# Patient Record
Sex: FEMALE | Race: WHITE | NOT HISPANIC OR LATINO | Employment: OTHER | URBAN - NONMETROPOLITAN AREA
[De-identification: names, ages, dates, MRNs, and addresses within clinical notes are randomized per-mention and may not be internally consistent; named-entity substitution may affect disease eponyms.]

---

## 2021-06-10 ENCOUNTER — APPOINTMENT (OUTPATIENT)
Dept: LAB | Facility: HOSPITAL | Age: 86
End: 2021-06-10
Attending: NURSE PRACTITIONER
Payer: MEDICARE

## 2021-06-10 DIAGNOSIS — N39.0 URINARY TRACT INFECTION, SITE NOT SPECIFIED: Primary | ICD-10-CM

## 2021-06-10 LAB
BACTERIA #/AREA URNS HPF: ABNORMAL /HPF
BILIRUB UR QL STRIP: NEGATIVE
CLARITY UR: CLEAR
COLOR UR: YELLOW
GLUCOSE UR QL STRIP: NEGATIVE
HGB UR QL STRIP: NEGATIVE
HYALINE CASTS #/AREA URNS LPF: 0 /LPF
KETONES UR QL STRIP: NEGATIVE
LEUKOCYTE ESTERASE UR QL STRIP: ABNORMAL
MICROSCOPIC COMMENT: ABNORMAL
NITRITE UR QL STRIP: NEGATIVE
PH UR STRIP: 6 [PH] (ref 5–8)
PROT UR QL STRIP: NEGATIVE
RBC #/AREA URNS HPF: ABNORMAL /HPF (ref 0–4)
SP GR UR STRIP: 1.02 (ref 1–1.03)
SQUAMOUS #/AREA URNS HPF: 9 /HPF
URN SPEC COLLECT METH UR: ABNORMAL
UROBILINOGEN UR STRIP-ACNC: 1 EU/DL
WBC #/AREA URNS HPF: 1 /HPF (ref 0–5)
YEAST URNS QL MICRO: ABNORMAL

## 2021-06-10 PROCEDURE — 81000 URINALYSIS NONAUTO W/SCOPE: CPT | Performed by: NURSE PRACTITIONER

## 2021-08-11 ENCOUNTER — APPOINTMENT (OUTPATIENT)
Dept: LAB | Facility: HOSPITAL | Age: 86
End: 2021-08-11
Attending: INTERNAL MEDICINE
Payer: COMMERCIAL

## 2021-08-11 DIAGNOSIS — N39.0 URINARY TRACT INFECTION, SITE NOT SPECIFIED: Primary | ICD-10-CM

## 2021-08-11 PROCEDURE — 87077 CULTURE AEROBIC IDENTIFY: CPT | Performed by: INTERNAL MEDICINE

## 2021-08-11 PROCEDURE — 87186 SC STD MICRODIL/AGAR DIL: CPT | Performed by: INTERNAL MEDICINE

## 2021-08-11 PROCEDURE — 87086 URINE CULTURE/COLONY COUNT: CPT | Performed by: INTERNAL MEDICINE

## 2021-08-11 PROCEDURE — 87088 URINE BACTERIA CULTURE: CPT | Performed by: INTERNAL MEDICINE

## 2021-08-14 LAB — BACTERIA UR CULT: ABNORMAL

## 2021-08-18 ENCOUNTER — HOSPITAL ENCOUNTER (EMERGENCY)
Facility: HOSPITAL | Age: 86
Discharge: HOME | End: 2021-08-19
Attending: EMERGENCY MEDICINE
Payer: COMMERCIAL
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
## 2021-08-18 DIAGNOSIS — S01.01XA SCALP LACERATION, INITIAL ENCOUNTER: ICD-10-CM

## 2021-08-18 LAB
ALBUMIN SERPL BCP-MCNC: 3.6 G/DL (ref 3.5–5.2)
ALP SERPL-CCNC: 100 U/L (ref 55–135)
ALT SERPL W/O P-5'-P-CCNC: 13 U/L (ref 10–44)
ANION GAP SERPL CALC-SCNC: 9 MMOL/L (ref 8–16)
AST SERPL-CCNC: 20 U/L (ref 10–40)
BASOPHILS # BLD AUTO: 0.06 K/UL (ref 0–0.2)
BASOPHILS NFR BLD: 0.8 % (ref 0–1.9)
BILIRUB SERPL-MCNC: 0.2 MG/DL (ref 0.1–1)
BUN SERPL-MCNC: 20 MG/DL (ref 10–30)
CALCIUM SERPL-MCNC: 9 MG/DL (ref 8.7–10.5)
CHLORIDE SERPL-SCNC: 103 MMOL/L (ref 95–110)
CO2 SERPL-SCNC: 31 MMOL/L (ref 23–29)
CREAT SERPL-MCNC: 1 MG/DL (ref 0.5–1.4)
DIFFERENTIAL METHOD: ABNORMAL
EOSINOPHIL # BLD AUTO: 0.3 K/UL (ref 0–0.5)
EOSINOPHIL NFR BLD: 4.6 % (ref 0–8)
ERYTHROCYTE [DISTWIDTH] IN BLOOD BY AUTOMATED COUNT: 15.7 % (ref 11.5–14.5)
EST. GFR  (AFRICAN AMERICAN): 56.1 ML/MIN/1.73 M^2
EST. GFR  (NON AFRICAN AMERICAN): 48.7 ML/MIN/1.73 M^2
GLUCOSE SERPL-MCNC: 126 MG/DL (ref 70–110)
HCT VFR BLD AUTO: 35.6 % (ref 37–48.5)
HGB BLD-MCNC: 11.7 G/DL (ref 12–16)
IMM GRANULOCYTES # BLD AUTO: 0.02 K/UL (ref 0–0.04)
IMM GRANULOCYTES NFR BLD AUTO: 0.3 % (ref 0–0.5)
INR PPP: 1 (ref 0.8–1.2)
LYMPHOCYTES # BLD AUTO: 2.6 K/UL (ref 1–4.8)
LYMPHOCYTES NFR BLD: 34.5 % (ref 18–48)
MCH RBC QN AUTO: 29.8 PG (ref 27–31)
MCHC RBC AUTO-ENTMCNC: 32.9 G/DL (ref 32–36)
MCV RBC AUTO: 91 FL (ref 82–98)
MONOCYTES # BLD AUTO: 0.6 K/UL (ref 0.3–1)
MONOCYTES NFR BLD: 7.7 % (ref 4–15)
NEUTROPHILS # BLD AUTO: 3.9 K/UL (ref 1.8–7.7)
NEUTROPHILS NFR BLD: 52.1 % (ref 38–73)
NRBC BLD-RTO: 0 /100 WBC
PLATELET # BLD AUTO: 160 K/UL (ref 150–450)
PMV BLD AUTO: 10.9 FL (ref 9.2–12.9)
POTASSIUM SERPL-SCNC: 3.6 MMOL/L (ref 3.5–5.1)
PROT SERPL-MCNC: 7.4 G/DL (ref 6–8.4)
PROTHROMBIN TIME: 10.6 SEC (ref 9–12.5)
RBC # BLD AUTO: 3.92 M/UL (ref 4–5.4)
SODIUM SERPL-SCNC: 143 MMOL/L (ref 136–145)
WBC # BLD AUTO: 7.41 K/UL (ref 3.9–12.7)

## 2021-08-18 PROCEDURE — 12002 RPR S/N/AX/GEN/TRNK2.6-7.5CM: CPT

## 2021-08-18 PROCEDURE — 99284 EMERGENCY DEPT VISIT MOD MDM: CPT | Mod: 25

## 2021-08-18 PROCEDURE — 85610 PROTHROMBIN TIME: CPT | Performed by: EMERGENCY MEDICINE

## 2021-08-18 PROCEDURE — 85025 COMPLETE CBC W/AUTO DIFF WBC: CPT | Performed by: EMERGENCY MEDICINE

## 2021-08-18 PROCEDURE — 36415 COLL VENOUS BLD VENIPUNCTURE: CPT | Performed by: EMERGENCY MEDICINE

## 2021-08-18 PROCEDURE — 80053 COMPREHEN METABOLIC PANEL: CPT | Performed by: EMERGENCY MEDICINE

## 2021-08-18 RX ORDER — ASPIRIN 81 MG/1
81 TABLET ORAL DAILY
COMMUNITY

## 2021-08-18 RX ORDER — MUPIROCIN 20 MG/G
1 OINTMENT TOPICAL
Status: COMPLETED | OUTPATIENT
Start: 2021-08-19 | End: 2021-08-19

## 2021-08-18 RX ORDER — POTASSIUM CHLORIDE 750 MG/1
20 CAPSULE, EXTENDED RELEASE ORAL DAILY
COMMUNITY
End: 2022-01-01

## 2021-08-18 RX ORDER — FUROSEMIDE 20 MG/1
20 TABLET ORAL 2 TIMES DAILY
COMMUNITY
End: 2022-01-01

## 2021-08-18 RX ORDER — METOPROLOL TARTRATE 25 MG/1
25 TABLET, FILM COATED ORAL DAILY
Status: ON HOLD | COMMUNITY
End: 2023-01-01 | Stop reason: HOSPADM

## 2021-08-19 VITALS
WEIGHT: 119.81 LBS | OXYGEN SATURATION: 98 % | RESPIRATION RATE: 18 BRPM | TEMPERATURE: 98 F | HEIGHT: 61 IN | BODY MASS INDEX: 22.62 KG/M2 | SYSTOLIC BLOOD PRESSURE: 150 MMHG | DIASTOLIC BLOOD PRESSURE: 70 MMHG | HEART RATE: 84 BPM

## 2021-08-19 PROCEDURE — 25000003 PHARM REV CODE 250: Performed by: EMERGENCY MEDICINE

## 2021-08-19 RX ADMIN — MUPIROCIN 22 G: 20 OINTMENT TOPICAL at 12:08

## 2022-01-01 ENCOUNTER — HOSPITAL ENCOUNTER (OUTPATIENT)
Dept: RADIOLOGY | Facility: HOSPITAL | Age: 87
Discharge: HOME | End: 2022-12-29
Payer: MEDICARE

## 2022-01-01 DIAGNOSIS — R05.8 PRODUCTIVE COUGH: ICD-10-CM

## 2022-01-01 DIAGNOSIS — R50.9 FEVER, UNSPECIFIED FEVER CAUSE: ICD-10-CM

## 2022-01-01 PROCEDURE — 71046 X-RAY EXAM CHEST 2 VIEWS: CPT | Mod: TC

## 2022-06-03 ENCOUNTER — LAB VISIT (OUTPATIENT)
Dept: LAB | Facility: HOSPITAL | Age: 87
End: 2022-06-03
Attending: INTERNAL MEDICINE
Payer: COMMERCIAL

## 2022-06-03 DIAGNOSIS — N30.00 ACUTE CYSTITIS WITHOUT HEMATURIA: ICD-10-CM

## 2022-06-03 PROBLEM — E78.5 HYPERLIPIDEMIA: Status: ACTIVE | Noted: 2022-06-03

## 2022-06-03 PROBLEM — N39.0 URINARY TRACT INFECTION: Status: ACTIVE | Noted: 2022-06-03

## 2022-06-03 PROBLEM — I25.10 ARTERIOSCLEROSIS OF CORONARY ARTERY: Status: ACTIVE | Noted: 2022-06-03

## 2022-06-03 PROBLEM — K21.9 GASTROESOPHAGEAL REFLUX: Status: ACTIVE | Noted: 2022-06-03

## 2022-06-03 PROBLEM — H61.22 IMPACTED CERUMEN OF LEFT EAR: Status: ACTIVE | Noted: 2022-06-03

## 2022-06-03 PROBLEM — I10 BENIGN ESSENTIAL HYPERTENSION: Status: ACTIVE | Noted: 2022-06-03

## 2022-06-03 PROCEDURE — 87186 SC STD MICRODIL/AGAR DIL: CPT | Performed by: INTERNAL MEDICINE

## 2022-06-03 PROCEDURE — 87077 CULTURE AEROBIC IDENTIFY: CPT | Performed by: INTERNAL MEDICINE

## 2022-06-03 PROCEDURE — 87086 URINE CULTURE/COLONY COUNT: CPT | Performed by: INTERNAL MEDICINE

## 2022-06-03 PROCEDURE — 87088 URINE BACTERIA CULTURE: CPT | Performed by: INTERNAL MEDICINE

## 2022-06-06 LAB — BACTERIA UR CULT: ABNORMAL

## 2022-12-06 PROBLEM — Z00.00 WELLNESS EXAMINATION: Status: ACTIVE | Noted: 2022-01-01

## 2022-12-29 NOTE — PROGRESS NOTES
Please let patient know xray does not show any acute findings but does show scarring of the lung tissue.  Continue current antibiotics and let us know if she has any worsening of symptoms or increasing SOB.

## 2023-01-01 ENCOUNTER — HOSPITAL ENCOUNTER (INPATIENT)
Facility: HOSPITAL | Age: 88
LOS: 2 days | Discharge: HOME | DRG: 194 | End: 2023-05-05
Attending: STUDENT IN AN ORGANIZED HEALTH CARE EDUCATION/TRAINING PROGRAM | Admitting: INTERNAL MEDICINE
Payer: MEDICARE

## 2023-01-01 ENCOUNTER — HOSPITAL ENCOUNTER (EMERGENCY)
Facility: HOSPITAL | Age: 88
Discharge: HOME | End: 2023-01-06
Attending: STUDENT IN AN ORGANIZED HEALTH CARE EDUCATION/TRAINING PROGRAM
Payer: MEDICARE

## 2023-01-01 VITALS
TEMPERATURE: 98 F | HEART RATE: 65 BPM | WEIGHT: 138.69 LBS | RESPIRATION RATE: 18 BRPM | HEIGHT: 63 IN | BODY MASS INDEX: 24.57 KG/M2 | OXYGEN SATURATION: 96 % | DIASTOLIC BLOOD PRESSURE: 64 MMHG | SYSTOLIC BLOOD PRESSURE: 104 MMHG

## 2023-01-01 VITALS
DIASTOLIC BLOOD PRESSURE: 54 MMHG | RESPIRATION RATE: 32 BRPM | OXYGEN SATURATION: 95 % | TEMPERATURE: 97 F | WEIGHT: 90 LBS | HEIGHT: 60 IN | HEART RATE: 105 BPM | SYSTOLIC BLOOD PRESSURE: 108 MMHG | BODY MASS INDEX: 17.67 KG/M2

## 2023-01-01 DIAGNOSIS — J18.9 PNEUMONIA DUE TO INFECTIOUS ORGANISM, UNSPECIFIED LATERALITY, UNSPECIFIED PART OF LUNG: Primary | ICD-10-CM

## 2023-01-01 DIAGNOSIS — R06.89 RESPIRATORY INSUFFICIENCY: ICD-10-CM

## 2023-01-01 DIAGNOSIS — R54 AGE-RELATED PHYSICAL DEBILITY: ICD-10-CM

## 2023-01-01 DIAGNOSIS — R06.02 SOB (SHORTNESS OF BREATH): ICD-10-CM

## 2023-01-01 DIAGNOSIS — R05.9 COUGH: ICD-10-CM

## 2023-01-01 DIAGNOSIS — E87.20 LACTIC ACIDOSIS: ICD-10-CM

## 2023-01-01 DIAGNOSIS — R07.9 CHEST PAIN: ICD-10-CM

## 2023-01-01 DIAGNOSIS — R53.1 WEAKNESS: ICD-10-CM

## 2023-01-01 DIAGNOSIS — J18.9 PNEUMONIA OF RIGHT LUNG DUE TO INFECTIOUS ORGANISM, UNSPECIFIED PART OF LUNG: Primary | ICD-10-CM

## 2023-01-01 LAB
ALBUMIN SERPL BCP-MCNC: 2.1 G/DL (ref 3.5–5.2)
ALBUMIN SERPL BCP-MCNC: 2.3 G/DL (ref 3.5–5.2)
ALBUMIN SERPL BCP-MCNC: 2.6 G/DL (ref 3.5–5.2)
ALBUMIN SERPL BCP-MCNC: 3.1 G/DL (ref 3.5–5.2)
ALP SERPL-CCNC: 103 U/L (ref 55–135)
ALP SERPL-CCNC: 46 U/L (ref 55–135)
ALP SERPL-CCNC: 54 U/L (ref 55–135)
ALP SERPL-CCNC: 70 U/L (ref 55–135)
ALT SERPL W/O P-5'-P-CCNC: 22 U/L (ref 10–44)
ALT SERPL W/O P-5'-P-CCNC: 26 U/L (ref 10–44)
ALT SERPL W/O P-5'-P-CCNC: 35 U/L (ref 10–44)
ALT SERPL W/O P-5'-P-CCNC: 38 U/L (ref 10–44)
ANION GAP SERPL CALC-SCNC: 1 MMOL/L (ref 8–16)
ANION GAP SERPL CALC-SCNC: 4 MMOL/L (ref 8–16)
ANION GAP SERPL CALC-SCNC: 5 MMOL/L (ref 8–16)
ANION GAP SERPL CALC-SCNC: 5 MMOL/L (ref 8–16)
APTT PPP: 23.3 SEC (ref 21–32)
AST SERPL-CCNC: 29 U/L (ref 10–40)
AST SERPL-CCNC: 43 U/L (ref 10–40)
AST SERPL-CCNC: 58 U/L (ref 10–40)
AST SERPL-CCNC: 69 U/L (ref 10–40)
BACTERIA BLD CULT: NORMAL
BACTERIA BLD CULT: NORMAL
BASOPHILS # BLD AUTO: 0.01 K/UL (ref 0–0.2)
BASOPHILS # BLD AUTO: 0.04 K/UL (ref 0–0.2)
BASOPHILS # BLD AUTO: 0.1 K/UL (ref 0–0.2)
BASOPHILS # BLD AUTO: ABNORMAL K/UL (ref 0–0.2)
BASOPHILS NFR BLD: 0 % (ref 0–1.9)
BASOPHILS NFR BLD: 0.1 % (ref 0–1.9)
BASOPHILS NFR BLD: 0.4 % (ref 0–1.9)
BASOPHILS NFR BLD: 0.7 % (ref 0–1.9)
BILIRUB SERPL-MCNC: 0.4 MG/DL (ref 0.1–1)
BILIRUB SERPL-MCNC: 0.6 MG/DL (ref 0.1–1)
BILIRUB SERPL-MCNC: 0.6 MG/DL (ref 0.1–1)
BILIRUB SERPL-MCNC: 0.7 MG/DL (ref 0.1–1)
BUN SERPL-MCNC: 11 MG/DL (ref 10–30)
BUN SERPL-MCNC: 12 MG/DL (ref 10–30)
BUN SERPL-MCNC: 15 MG/DL (ref 10–30)
BUN SERPL-MCNC: 32 MG/DL (ref 10–30)
CALCIUM SERPL-MCNC: 8.2 MG/DL (ref 8.7–10.5)
CALCIUM SERPL-MCNC: 8.3 MG/DL (ref 8.7–10.5)
CALCIUM SERPL-MCNC: 9 MG/DL (ref 8.7–10.5)
CALCIUM SERPL-MCNC: 9.2 MG/DL (ref 8.7–10.5)
CHLORIDE SERPL-SCNC: 100 MMOL/L (ref 95–110)
CHLORIDE SERPL-SCNC: 103 MMOL/L (ref 95–110)
CHLORIDE SERPL-SCNC: 105 MMOL/L (ref 95–110)
CHLORIDE SERPL-SCNC: 99 MMOL/L (ref 95–110)
CO2 SERPL-SCNC: 27 MMOL/L (ref 23–29)
CO2 SERPL-SCNC: 30 MMOL/L (ref 23–29)
CO2 SERPL-SCNC: 31 MMOL/L (ref 23–29)
CO2 SERPL-SCNC: 33 MMOL/L (ref 23–29)
CREAT SERPL-MCNC: 0.7 MG/DL (ref 0.5–1.4)
CREAT SERPL-MCNC: 0.7 MG/DL (ref 0.5–1.4)
CREAT SERPL-MCNC: 1 MG/DL (ref 0.5–1.4)
CREAT SERPL-MCNC: 1.1 MG/DL (ref 0.5–1.4)
DIFFERENTIAL METHOD: ABNORMAL
EOSINOPHIL # BLD AUTO: 0 K/UL (ref 0–0.5)
EOSINOPHIL # BLD AUTO: 0.1 K/UL (ref 0–0.5)
EOSINOPHIL # BLD AUTO: 0.1 K/UL (ref 0–0.5)
EOSINOPHIL # BLD AUTO: ABNORMAL K/UL (ref 0–0.5)
EOSINOPHIL NFR BLD: 0 % (ref 0–8)
EOSINOPHIL NFR BLD: 0 % (ref 0–8)
EOSINOPHIL NFR BLD: 0.8 % (ref 0–8)
EOSINOPHIL NFR BLD: 1 % (ref 0–8)
ERYTHROCYTE [DISTWIDTH] IN BLOOD BY AUTOMATED COUNT: 14.6 % (ref 11.5–14.5)
ERYTHROCYTE [DISTWIDTH] IN BLOOD BY AUTOMATED COUNT: 15 % (ref 11.5–14.5)
ERYTHROCYTE [DISTWIDTH] IN BLOOD BY AUTOMATED COUNT: 15 % (ref 11.5–14.5)
ERYTHROCYTE [DISTWIDTH] IN BLOOD BY AUTOMATED COUNT: 15.5 % (ref 11.5–14.5)
EST. GFR  (NO RACE VARIABLE): 46.6 ML/MIN/1.73 M^2
EST. GFR  (NO RACE VARIABLE): 51.9 ML/MIN/1.73 M^2
EST. GFR  (NO RACE VARIABLE): >60 ML/MIN/1.73 M^2
EST. GFR  (NO RACE VARIABLE): >60 ML/MIN/1.73 M^2
GLUCOSE SERPL-MCNC: 121 MG/DL (ref 70–110)
GLUCOSE SERPL-MCNC: 129 MG/DL (ref 70–110)
GLUCOSE SERPL-MCNC: 136 MG/DL (ref 70–110)
GLUCOSE SERPL-MCNC: 142 MG/DL (ref 70–110)
HCT VFR BLD AUTO: 31.7 % (ref 37–48.5)
HCT VFR BLD AUTO: 34.6 % (ref 37–48.5)
HCT VFR BLD AUTO: 42.5 % (ref 37–48.5)
HCT VFR BLD AUTO: 42.6 % (ref 37–48.5)
HGB BLD-MCNC: 10.9 G/DL (ref 12–16)
HGB BLD-MCNC: 12 G/DL (ref 12–16)
HGB BLD-MCNC: 14.2 G/DL (ref 12–16)
HGB BLD-MCNC: 14.4 G/DL (ref 12–16)
HYPOCHROMIA BLD QL SMEAR: ABNORMAL
IMM GRANULOCYTES # BLD AUTO: 0.03 K/UL (ref 0–0.04)
IMM GRANULOCYTES # BLD AUTO: 0.08 K/UL (ref 0–0.04)
IMM GRANULOCYTES # BLD AUTO: 0.57 K/UL (ref 0–0.04)
IMM GRANULOCYTES # BLD AUTO: ABNORMAL K/UL (ref 0–0.04)
IMM GRANULOCYTES NFR BLD AUTO: 0.3 % (ref 0–0.5)
IMM GRANULOCYTES NFR BLD AUTO: 0.7 % (ref 0–0.5)
IMM GRANULOCYTES NFR BLD AUTO: 4 % (ref 0–0.5)
IMM GRANULOCYTES NFR BLD AUTO: ABNORMAL % (ref 0–0.5)
INR PPP: 1.2 (ref 0.8–1.2)
LACTATE SERPL-SCNC: 3.4 MMOL/L (ref 0.5–2.2)
LACTATE SERPL-SCNC: 4.4 MMOL/L (ref 0.5–2.2)
LACTATE SERPL-SCNC: 5.9 MMOL/L (ref 0.5–2.2)
LYMPHOCYTES # BLD AUTO: 0.8 K/UL (ref 1–4.8)
LYMPHOCYTES # BLD AUTO: 1.9 K/UL (ref 1–4.8)
LYMPHOCYTES # BLD AUTO: 2.2 K/UL (ref 1–4.8)
LYMPHOCYTES # BLD AUTO: ABNORMAL K/UL (ref 1–4.8)
LYMPHOCYTES NFR BLD: 10 % (ref 18–48)
LYMPHOCYTES NFR BLD: 15 % (ref 18–48)
LYMPHOCYTES NFR BLD: 18.7 % (ref 18–48)
LYMPHOCYTES NFR BLD: 7.7 % (ref 18–48)
MAGNESIUM SERPL-MCNC: 1.7 MG/DL (ref 1.6–2.6)
MAGNESIUM SERPL-MCNC: 1.8 MG/DL (ref 1.6–2.6)
MCH RBC QN AUTO: 28.8 PG (ref 27–31)
MCH RBC QN AUTO: 31.2 PG (ref 27–31)
MCH RBC QN AUTO: 31.2 PG (ref 27–31)
MCH RBC QN AUTO: 31.3 PG (ref 27–31)
MCHC RBC AUTO-ENTMCNC: 33.4 G/DL (ref 32–36)
MCHC RBC AUTO-ENTMCNC: 33.8 G/DL (ref 32–36)
MCHC RBC AUTO-ENTMCNC: 34.4 G/DL (ref 32–36)
MCHC RBC AUTO-ENTMCNC: 34.7 G/DL (ref 32–36)
MCV RBC AUTO: 86 FL (ref 82–98)
MCV RBC AUTO: 90 FL (ref 82–98)
MCV RBC AUTO: 91 FL (ref 82–98)
MCV RBC AUTO: 92 FL (ref 82–98)
MONOCYTES # BLD AUTO: 0.3 K/UL (ref 0.3–1)
MONOCYTES # BLD AUTO: 0.4 K/UL (ref 0.3–1)
MONOCYTES # BLD AUTO: 0.6 K/UL (ref 0.3–1)
MONOCYTES # BLD AUTO: ABNORMAL K/UL (ref 0.3–1)
MONOCYTES NFR BLD: 2.6 % (ref 4–15)
MONOCYTES NFR BLD: 4 % (ref 4–15)
MONOCYTES NFR BLD: 4.2 % (ref 4–15)
MONOCYTES NFR BLD: 4.3 % (ref 4–15)
NEUTROPHILS # BLD AUTO: 10.8 K/UL (ref 1.8–7.7)
NEUTROPHILS # BLD AUTO: 7.6 K/UL (ref 1.8–7.7)
NEUTROPHILS # BLD AUTO: 9.6 K/UL (ref 1.8–7.7)
NEUTROPHILS NFR BLD: 75.2 % (ref 38–73)
NEUTROPHILS NFR BLD: 75.4 % (ref 38–73)
NEUTROPHILS NFR BLD: 77 % (ref 38–73)
NEUTROPHILS NFR BLD: 88.9 % (ref 38–73)
NEUTS BAND NFR BLD MANUAL: 9 %
NRBC BLD-RTO: 0 /100 WBC
NT-PROBNP SERPL-MCNC: 1242 PG/ML (ref 5–1800)
NT-PROBNP SERPL-MCNC: 578 PG/ML (ref 5–1800)
PLATELET # BLD AUTO: 131 K/UL (ref 150–450)
PLATELET # BLD AUTO: 131 K/UL (ref 150–450)
PLATELET # BLD AUTO: 185 K/UL (ref 150–450)
PLATELET # BLD AUTO: 275 K/UL (ref 150–450)
PLATELET BLD QL SMEAR: ABNORMAL
PMV BLD AUTO: 10.5 FL (ref 9.2–12.9)
PMV BLD AUTO: 10.5 FL (ref 9.2–12.9)
PMV BLD AUTO: 10.6 FL (ref 9.2–12.9)
PMV BLD AUTO: 11.6 FL (ref 9.2–12.9)
POTASSIUM SERPL-SCNC: 3.1 MMOL/L (ref 3.5–5.1)
POTASSIUM SERPL-SCNC: 4 MMOL/L (ref 3.5–5.1)
POTASSIUM SERPL-SCNC: 4 MMOL/L (ref 3.5–5.1)
POTASSIUM SERPL-SCNC: 5.2 MMOL/L (ref 3.5–5.1)
PROT SERPL-MCNC: 6.4 G/DL (ref 6–8.4)
PROT SERPL-MCNC: 6.6 G/DL (ref 6–8.4)
PROT SERPL-MCNC: 8.2 G/DL (ref 6–8.4)
PROT SERPL-MCNC: 8.3 G/DL (ref 6–8.4)
PROTHROMBIN TIME: 12.1 SEC (ref 9–12.5)
RBC # BLD AUTO: 3.48 M/UL (ref 4–5.4)
RBC # BLD AUTO: 3.85 M/UL (ref 4–5.4)
RBC # BLD AUTO: 4.62 M/UL (ref 4–5.4)
RBC # BLD AUTO: 4.93 M/UL (ref 4–5.4)
SODIUM SERPL-SCNC: 134 MMOL/L (ref 136–145)
SODIUM SERPL-SCNC: 135 MMOL/L (ref 136–145)
SODIUM SERPL-SCNC: 135 MMOL/L (ref 136–145)
SODIUM SERPL-SCNC: 139 MMOL/L (ref 136–145)
TROPONIN I SERPL DL<=0.01 NG/ML-MCNC: 18.5 PG/ML (ref 0–60)
TROPONIN I SERPL DL<=0.01 NG/ML-MCNC: 56 PG/ML (ref 0–60)
WBC # BLD AUTO: 10.14 K/UL (ref 3.9–12.7)
WBC # BLD AUTO: 10.79 K/UL (ref 3.9–12.7)
WBC # BLD AUTO: 14.4 K/UL (ref 3.9–12.7)
WBC # BLD AUTO: 16.48 K/UL (ref 3.9–12.7)

## 2023-01-01 PROCEDURE — 63600175 PHARM REV CODE 636 W HCPCS: Performed by: INTERNAL MEDICINE

## 2023-01-01 PROCEDURE — 63600175 PHARM REV CODE 636 W HCPCS: Performed by: STUDENT IN AN ORGANIZED HEALTH CARE EDUCATION/TRAINING PROGRAM

## 2023-01-01 PROCEDURE — 25000003 PHARM REV CODE 250: Performed by: STUDENT IN AN ORGANIZED HEALTH CARE EDUCATION/TRAINING PROGRAM

## 2023-01-01 PROCEDURE — 80053 COMPREHEN METABOLIC PANEL: CPT | Performed by: INTERNAL MEDICINE

## 2023-01-01 PROCEDURE — 84484 ASSAY OF TROPONIN QUANT: CPT | Performed by: STUDENT IN AN ORGANIZED HEALTH CARE EDUCATION/TRAINING PROGRAM

## 2023-01-01 PROCEDURE — 99900031 HC PATIENT EDUCATION (STAT)

## 2023-01-01 PROCEDURE — 99900035 HC TECH TIME PER 15 MIN (STAT)

## 2023-01-01 PROCEDURE — 27000221 HC OXYGEN, UP TO 24 HOURS

## 2023-01-01 PROCEDURE — 94761 N-INVAS EAR/PLS OXIMETRY MLT: CPT

## 2023-01-01 PROCEDURE — 83605 ASSAY OF LACTIC ACID: CPT | Performed by: STUDENT IN AN ORGANIZED HEALTH CARE EDUCATION/TRAINING PROGRAM

## 2023-01-01 PROCEDURE — 99285 EMERGENCY DEPT VISIT HI MDM: CPT | Mod: 25

## 2023-01-01 PROCEDURE — 96365 THER/PROPH/DIAG IV INF INIT: CPT

## 2023-01-01 PROCEDURE — 94640 AIRWAY INHALATION TREATMENT: CPT

## 2023-01-01 PROCEDURE — 80053 COMPREHEN METABOLIC PANEL: CPT | Performed by: STUDENT IN AN ORGANIZED HEALTH CARE EDUCATION/TRAINING PROGRAM

## 2023-01-01 PROCEDURE — 93005 ELECTROCARDIOGRAM TRACING: CPT

## 2023-01-01 PROCEDURE — 93010 EKG 12-LEAD: ICD-10-PCS | Mod: ,,, | Performed by: INTERNAL MEDICINE

## 2023-01-01 PROCEDURE — 85730 THROMBOPLASTIN TIME PARTIAL: CPT | Performed by: STUDENT IN AN ORGANIZED HEALTH CARE EDUCATION/TRAINING PROGRAM

## 2023-01-01 PROCEDURE — 25500020 PHARM REV CODE 255: Performed by: INTERNAL MEDICINE

## 2023-01-01 PROCEDURE — 85007 BL SMEAR W/DIFF WBC COUNT: CPT | Performed by: STUDENT IN AN ORGANIZED HEALTH CARE EDUCATION/TRAINING PROGRAM

## 2023-01-01 PROCEDURE — 85025 COMPLETE CBC W/AUTO DIFF WBC: CPT | Performed by: STUDENT IN AN ORGANIZED HEALTH CARE EDUCATION/TRAINING PROGRAM

## 2023-01-01 PROCEDURE — 94760 N-INVAS EAR/PLS OXIMETRY 1: CPT

## 2023-01-01 PROCEDURE — 83880 ASSAY OF NATRIURETIC PEPTIDE: CPT | Performed by: STUDENT IN AN ORGANIZED HEALTH CARE EDUCATION/TRAINING PROGRAM

## 2023-01-01 PROCEDURE — 36415 COLL VENOUS BLD VENIPUNCTURE: CPT | Performed by: STUDENT IN AN ORGANIZED HEALTH CARE EDUCATION/TRAINING PROGRAM

## 2023-01-01 PROCEDURE — 85610 PROTHROMBIN TIME: CPT | Performed by: STUDENT IN AN ORGANIZED HEALTH CARE EDUCATION/TRAINING PROGRAM

## 2023-01-01 PROCEDURE — 83735 ASSAY OF MAGNESIUM: CPT | Performed by: STUDENT IN AN ORGANIZED HEALTH CARE EDUCATION/TRAINING PROGRAM

## 2023-01-01 PROCEDURE — 25000242 PHARM REV CODE 250 ALT 637 W/ HCPCS: Performed by: INTERNAL MEDICINE

## 2023-01-01 PROCEDURE — 85025 COMPLETE CBC W/AUTO DIFF WBC: CPT | Performed by: INTERNAL MEDICINE

## 2023-01-01 PROCEDURE — 83605 ASSAY OF LACTIC ACID: CPT | Mod: 91 | Performed by: INTERNAL MEDICINE

## 2023-01-01 PROCEDURE — 25000003 PHARM REV CODE 250: Performed by: INTERNAL MEDICINE

## 2023-01-01 PROCEDURE — 93010 ELECTROCARDIOGRAM REPORT: CPT | Mod: ,,, | Performed by: INTERNAL MEDICINE

## 2023-01-01 PROCEDURE — 87040 BLOOD CULTURE FOR BACTERIA: CPT | Mod: 59 | Performed by: STUDENT IN AN ORGANIZED HEALTH CARE EDUCATION/TRAINING PROGRAM

## 2023-01-01 PROCEDURE — 25000242 PHARM REV CODE 250 ALT 637 W/ HCPCS: Performed by: STUDENT IN AN ORGANIZED HEALTH CARE EDUCATION/TRAINING PROGRAM

## 2023-01-01 PROCEDURE — 21400001 HC TELEMETRY ROOM

## 2023-01-01 PROCEDURE — 36415 COLL VENOUS BLD VENIPUNCTURE: CPT | Performed by: INTERNAL MEDICINE

## 2023-01-01 PROCEDURE — 83735 ASSAY OF MAGNESIUM: CPT | Performed by: INTERNAL MEDICINE

## 2023-01-01 PROCEDURE — 85027 COMPLETE CBC AUTOMATED: CPT | Performed by: STUDENT IN AN ORGANIZED HEALTH CARE EDUCATION/TRAINING PROGRAM

## 2023-01-01 RX ORDER — METHYLPREDNISOLONE SOD SUCC 125 MG
80 VIAL (EA) INJECTION
Status: DISCONTINUED | OUTPATIENT
Start: 2023-01-01 | End: 2023-01-01

## 2023-01-01 RX ORDER — ACETAMINOPHEN 325 MG/1
650 TABLET ORAL EVERY 8 HOURS PRN
Refills: 0
Start: 2023-01-01

## 2023-01-01 RX ORDER — GUAIFENESIN/DEXTROMETHORPHAN 100-10MG/5
10 SYRUP ORAL 3 TIMES DAILY
Qty: 120 ML | Refills: 0
Start: 2023-01-01 | End: 2023-01-01

## 2023-01-01 RX ORDER — ACETAMINOPHEN 325 MG/1
650 TABLET ORAL EVERY 8 HOURS PRN
Status: DISCONTINUED | OUTPATIENT
Start: 2023-01-01 | End: 2023-01-01 | Stop reason: HOSPADM

## 2023-01-01 RX ORDER — METOPROLOL SUCCINATE 25 MG/1
25 TABLET, EXTENDED RELEASE ORAL DAILY
Status: DISCONTINUED | OUTPATIENT
Start: 2023-01-01 | End: 2023-01-01 | Stop reason: HOSPADM

## 2023-01-01 RX ORDER — SODIUM CHLORIDE, SODIUM LACTATE, POTASSIUM CHLORIDE, CALCIUM CHLORIDE 600; 310; 30; 20 MG/100ML; MG/100ML; MG/100ML; MG/100ML
1000 INJECTION, SOLUTION INTRAVENOUS CONTINUOUS
Status: ACTIVE | OUTPATIENT
Start: 2023-01-01 | End: 2023-01-01

## 2023-01-01 RX ORDER — AZITHROMYCIN 250 MG/1
500 TABLET, FILM COATED ORAL
Status: DISCONTINUED | OUTPATIENT
Start: 2023-01-01 | End: 2023-01-01

## 2023-01-01 RX ORDER — POTASSIUM CHLORIDE 20 MEQ/1
20 TABLET, EXTENDED RELEASE ORAL DAILY
Status: DISCONTINUED | OUTPATIENT
Start: 2023-01-01 | End: 2023-01-01

## 2023-01-01 RX ORDER — POTASSIUM CHLORIDE 20 MEQ/1
20 TABLET, EXTENDED RELEASE ORAL 2 TIMES DAILY
Status: DISCONTINUED | OUTPATIENT
Start: 2023-01-01 | End: 2023-01-01

## 2023-01-01 RX ORDER — LEVOFLOXACIN 500 MG/1
500 TABLET, FILM COATED ORAL DAILY
Qty: 7 TABLET | Refills: 0 | Status: SHIPPED | OUTPATIENT
Start: 2023-01-01 | End: 2023-01-01

## 2023-01-01 RX ORDER — ENOXAPARIN SODIUM 100 MG/ML
40 INJECTION SUBCUTANEOUS EVERY 24 HOURS
Status: DISCONTINUED | OUTPATIENT
Start: 2023-01-01 | End: 2023-01-01 | Stop reason: HOSPADM

## 2023-01-01 RX ORDER — ONDANSETRON 2 MG/ML
4 INJECTION INTRAMUSCULAR; INTRAVENOUS EVERY 8 HOURS PRN
Status: DISCONTINUED | OUTPATIENT
Start: 2023-01-01 | End: 2023-01-01 | Stop reason: HOSPADM

## 2023-01-01 RX ORDER — IPRATROPIUM BROMIDE AND ALBUTEROL SULFATE 2.5; .5 MG/3ML; MG/3ML
3 SOLUTION RESPIRATORY (INHALATION)
Status: DISCONTINUED | OUTPATIENT
Start: 2023-01-01 | End: 2023-01-01 | Stop reason: HOSPADM

## 2023-01-01 RX ORDER — GUAIFENESIN/DEXTROMETHORPHAN 100-10MG/5
10 SYRUP ORAL 3 TIMES DAILY
Status: DISCONTINUED | OUTPATIENT
Start: 2023-01-01 | End: 2023-01-01 | Stop reason: HOSPADM

## 2023-01-01 RX ORDER — LEVOFLOXACIN 500 MG/1
500 TABLET, FILM COATED ORAL DAILY
Qty: 5 TABLET | Refills: 0 | Status: SHIPPED | OUTPATIENT
Start: 2023-01-01 | End: 2023-01-01

## 2023-01-01 RX ORDER — MUPIROCIN 20 MG/G
OINTMENT TOPICAL 2 TIMES DAILY
Status: DISCONTINUED | OUTPATIENT
Start: 2023-01-01 | End: 2023-01-01 | Stop reason: HOSPADM

## 2023-01-01 RX ORDER — IPRATROPIUM BROMIDE AND ALBUTEROL SULFATE 2.5; .5 MG/3ML; MG/3ML
3 SOLUTION RESPIRATORY (INHALATION)
Status: COMPLETED | OUTPATIENT
Start: 2023-01-01 | End: 2023-01-01

## 2023-01-01 RX ORDER — ENOXAPARIN SODIUM 100 MG/ML
40 INJECTION SUBCUTANEOUS EVERY 12 HOURS
Status: DISCONTINUED | OUTPATIENT
Start: 2023-01-01 | End: 2023-01-01

## 2023-01-01 RX ORDER — ACETAMINOPHEN 325 MG/1
650 TABLET ORAL
Status: COMPLETED | OUTPATIENT
Start: 2023-01-01 | End: 2023-01-01

## 2023-01-01 RX ORDER — FAMOTIDINE 20 MG/1
20 TABLET, FILM COATED ORAL DAILY
Status: DISCONTINUED | OUTPATIENT
Start: 2023-01-01 | End: 2023-01-01 | Stop reason: HOSPADM

## 2023-01-01 RX ORDER — SODIUM CHLORIDE AND POTASSIUM CHLORIDE 150; 900 MG/100ML; MG/100ML
INJECTION, SOLUTION INTRAVENOUS CONTINUOUS
Status: DISCONTINUED | OUTPATIENT
Start: 2023-01-01 | End: 2023-01-01

## 2023-01-01 RX ORDER — ASPIRIN 81 MG/1
81 TABLET ORAL DAILY
Status: DISCONTINUED | OUTPATIENT
Start: 2023-01-01 | End: 2023-01-01 | Stop reason: HOSPADM

## 2023-01-01 RX ORDER — ALBUTEROL SULFATE 0.83 MG/ML
2.5 SOLUTION RESPIRATORY (INHALATION) 3 TIMES DAILY
Qty: 90 ML | Refills: 3 | Status: SHIPPED | OUTPATIENT
Start: 2023-01-01

## 2023-01-01 RX ORDER — TALC
6 POWDER (GRAM) TOPICAL NIGHTLY PRN
Status: DISCONTINUED | OUTPATIENT
Start: 2023-01-01 | End: 2023-01-01 | Stop reason: HOSPADM

## 2023-01-01 RX ORDER — PREDNISONE 20 MG/1
20 TABLET ORAL DAILY
Qty: 5 TABLET | Refills: 0 | Status: SHIPPED | OUTPATIENT
Start: 2023-01-01 | End: 2023-01-01

## 2023-01-01 RX ORDER — PRAVASTATIN SODIUM 20 MG/1
80 TABLET ORAL DAILY
Status: DISCONTINUED | OUTPATIENT
Start: 2023-01-01 | End: 2023-01-01 | Stop reason: HOSPADM

## 2023-01-01 RX ORDER — SODIUM CHLORIDE 0.9 % (FLUSH) 0.9 %
10 SYRINGE (ML) INJECTION
Status: DISCONTINUED | OUTPATIENT
Start: 2023-01-01 | End: 2023-01-01 | Stop reason: HOSPADM

## 2023-01-01 RX ADMIN — SODIUM CHLORIDE, POTASSIUM CHLORIDE, SODIUM LACTATE AND CALCIUM CHLORIDE 1000 ML: 600; 310; 30; 20 INJECTION, SOLUTION INTRAVENOUS at 02:05

## 2023-01-01 RX ADMIN — POTASSIUM CHLORIDE 20 MEQ: 1500 TABLET, EXTENDED RELEASE ORAL at 09:05

## 2023-01-01 RX ADMIN — FAMOTIDINE 20 MG: 20 TABLET ORAL at 08:05

## 2023-01-01 RX ADMIN — METHYLPREDNISOLONE SODIUM SUCCINATE 80 MG: 125 INJECTION, POWDER, FOR SOLUTION INTRAMUSCULAR; INTRAVENOUS at 12:05

## 2023-01-01 RX ADMIN — ACETAMINOPHEN 650 MG: 325 TABLET ORAL at 12:05

## 2023-01-01 RX ADMIN — IPRATROPIUM BROMIDE AND ALBUTEROL SULFATE 3 ML: .5; 3 SOLUTION RESPIRATORY (INHALATION) at 10:05

## 2023-01-01 RX ADMIN — AZITHROMYCIN DIHYDRATE 500 MG: 500 INJECTION, POWDER, LYOPHILIZED, FOR SOLUTION INTRAVENOUS at 09:05

## 2023-01-01 RX ADMIN — IPRATROPIUM BROMIDE AND ALBUTEROL SULFATE 3 ML: .5; 3 SOLUTION RESPIRATORY (INHALATION) at 03:05

## 2023-01-01 RX ADMIN — GUAIFENESIN AND DEXTROMETHORPHAN 10 ML: 100; 10 SYRUP ORAL at 03:05

## 2023-01-01 RX ADMIN — ASPIRIN 81 MG: 81 TABLET, COATED ORAL at 08:05

## 2023-01-01 RX ADMIN — SODIUM CHLORIDE AND POTASSIUM CHLORIDE: .9; .15 SOLUTION INTRAVENOUS at 10:05

## 2023-01-01 RX ADMIN — ENOXAPARIN SODIUM 40 MG: 40 INJECTION SUBCUTANEOUS at 05:05

## 2023-01-01 RX ADMIN — IOHEXOL 100 ML: 350 INJECTION, SOLUTION INTRAVENOUS at 01:05

## 2023-01-01 RX ADMIN — IPRATROPIUM BROMIDE AND ALBUTEROL SULFATE 3 ML: .5; 3 SOLUTION RESPIRATORY (INHALATION) at 01:05

## 2023-01-01 RX ADMIN — IPRATROPIUM BROMIDE AND ALBUTEROL SULFATE 3 ML: .5; 3 SOLUTION RESPIRATORY (INHALATION) at 07:05

## 2023-01-01 RX ADMIN — METOPROLOL SUCCINATE 25 MG: 25 TABLET, EXTENDED RELEASE ORAL at 08:05

## 2023-01-01 RX ADMIN — METHYLPREDNISOLONE SODIUM SUCCINATE 80 MG: 40 INJECTION, POWDER, FOR SOLUTION INTRAMUSCULAR; INTRAVENOUS at 08:05

## 2023-01-01 RX ADMIN — MUPIROCIN: 20 OINTMENT TOPICAL at 09:05

## 2023-01-01 RX ADMIN — MUPIROCIN: 20 OINTMENT TOPICAL at 08:05

## 2023-01-01 RX ADMIN — CEFTRIAXONE SODIUM 1 G: 1 INJECTION, POWDER, FOR SOLUTION INTRAMUSCULAR; INTRAVENOUS at 12:05

## 2023-01-01 RX ADMIN — ENOXAPARIN SODIUM 40 MG: 40 INJECTION SUBCUTANEOUS at 03:05

## 2023-01-01 RX ADMIN — AZITHROMYCIN DIHYDRATE 500 MG: 500 INJECTION, POWDER, LYOPHILIZED, FOR SOLUTION INTRAVENOUS at 12:05

## 2023-01-01 RX ADMIN — SODIUM CHLORIDE 1000 ML: 9 INJECTION, SOLUTION INTRAVENOUS at 12:05

## 2023-01-01 RX ADMIN — ASPIRIN 81 MG: 81 TABLET, COATED ORAL at 09:05

## 2023-01-01 RX ADMIN — GUAIFENESIN AND DEXTROMETHORPHAN 10 ML: 100; 10 SYRUP ORAL at 08:05

## 2023-01-01 RX ADMIN — CEFTRIAXONE SODIUM 1 G: 1 INJECTION, POWDER, FOR SOLUTION INTRAMUSCULAR; INTRAVENOUS at 01:05

## 2023-01-01 RX ADMIN — PRAVASTATIN SODIUM 80 MG: 20 TABLET ORAL at 09:05

## 2023-01-01 RX ADMIN — GUAIFENESIN AND DEXTROMETHORPHAN 10 ML: 100; 10 SYRUP ORAL at 09:05

## 2023-01-01 RX ADMIN — PRAVASTATIN SODIUM 80 MG: 20 TABLET ORAL at 08:05

## 2023-01-01 RX ADMIN — POTASSIUM CHLORIDE 20 MEQ: 1500 TABLET, EXTENDED RELEASE ORAL at 08:05

## 2023-01-01 RX ADMIN — FAMOTIDINE 20 MG: 20 TABLET ORAL at 09:05

## 2023-01-01 RX ADMIN — METHYLPREDNISOLONE SODIUM SUCCINATE 80 MG: 125 INJECTION, POWDER, FOR SOLUTION INTRAMUSCULAR; INTRAVENOUS at 03:05

## 2023-01-01 RX ADMIN — Medication 6 MG: at 08:05

## 2023-01-01 RX ADMIN — SODIUM CHLORIDE, POTASSIUM CHLORIDE, SODIUM LACTATE AND CALCIUM CHLORIDE 1000 ML: 600; 310; 30; 20 INJECTION, SOLUTION INTRAVENOUS at 09:05

## 2023-01-06 NOTE — ED PROVIDER NOTES
Encounter Date: 1/6/2023       History     Chief Complaint   Patient presents with    Weakness     Generalized weakness, cough cold symptoms x 2 weeks. Was seen by Solet this morning, had labs but was advised to come to ED for further eval.      89-year-old female with history of CABG, hypertension, high cholesterol presents with generalized weakness associated with cough for the past 2 weeks.  Patient was seen by see PCP and x-ray showing no acute changes but was placed on doxycycline.  Patient continues to have symptoms so instructed to come in for evaluation.  Denies any chest pain, shortness of breath, leg swelling,    Review of patient's allergies indicates:  No Known Allergies  Past Medical History:   Diagnosis Date    Anemia     Pernicious    B12 deficiency     CAD (coronary artery disease)     Cellulitis of right leg     Cerumen impaction 08/03/2021    Constipation     Diarrhea     Dizziness     Dysphagia     Gastroenteritis due to group B Shigella     GERD (gastroesophageal reflux disease)     Hyperlipidemia     Hypertension     Pneumonia     Thoracic back pain      Past Surgical History:   Procedure Laterality Date    ABDOMINAL SURGERY      Rib Surgery d/t h/o accident    CHOLECYSTECTOMY      COLONOSCOPY  2012    CORONARY ARTERY BYPASS GRAFT  2000,2006    x2    CYSTOSCOPY WITH URETHRAL DILATION      HYSTERECTOMY      Complete      Family History   Problem Relation Age of Onset    Stomach cancer Mother     Heart disease Father      Social History     Tobacco Use    Smoking status: Never    Smokeless tobacco: Never   Substance Use Topics    Alcohol use: Not Currently    Drug use: Not Currently     Review of Systems   Constitutional: Negative.    HENT: Negative.     Respiratory:  Positive for cough.    Cardiovascular: Negative.    Gastrointestinal: Negative.    Genitourinary: Negative.    Musculoskeletal: Negative.    Skin: Negative.    Neurological:  Positive for weakness.   Psychiatric/Behavioral: Negative.      All other systems reviewed and are negative.    Physical Exam     Initial Vitals   BP Pulse Resp Temp SpO2   01/06/23 0924 01/06/23 0924 01/06/23 0924 01/06/23 0953 01/06/23 0924   (!) 174/79 88 18 97.8 °F (36.6 °C) (!) 94 %      MAP       --                Physical Exam    Nursing note and vitals reviewed.  Constitutional: Vital signs are normal.   Chronically ill   HENT:   Head: Normocephalic and atraumatic.   Eyes: Conjunctivae and lids are normal.   Neck: Trachea normal. Neck supple.   Cardiovascular:  Normal rate, regular rhythm, normal heart sounds, intact distal pulses and normal pulses.           No murmur heard.  Pulmonary/Chest: Breath sounds normal. No respiratory distress.   Abdominal: Abdomen is soft. Bowel sounds are normal.   Musculoskeletal:         General: Normal range of motion.      Cervical back: Neck supple.     Neurological: She is alert and oriented to person, place, and time. She has normal strength. No cranial nerve deficit or sensory deficit.   Skin: Skin is warm. Capillary refill takes less than 2 seconds.   Psychiatric: She has a normal mood and affect. Her speech is normal. Thought content normal.       ED Course   Procedures  Labs Reviewed   CBC W/ AUTO DIFFERENTIAL - Abnormal; Notable for the following components:       Result Value    WBC 14.40 (*)     RDW 15.5 (*)     Immature Granulocytes 4.0 (*)     Gran # (ANC) 10.8 (*)     Immature Grans (Abs) 0.57 (*)     Gran % 75.2 (*)     Lymph % 15.0 (*)     All other components within normal limits   COMPREHENSIVE METABOLIC PANEL - Abnormal; Notable for the following components:    Sodium 135 (*)     Potassium 5.2 (*)     Glucose 121 (*)     BUN 32 (*)     Albumin 2.6 (*)     AST 58 (*)     Anion Gap 5 (*)     eGFR 46.6 (*)     All other components within normal limits   TROPONIN I HIGH SENSITIVITY   NT-PRO NATRIURETIC PEPTIDE   MAGNESIUM     EKG Readings: (Independently Interpreted)   Initial Reading: No STEMI. Rhythm: Normal Sinus  Rhythm.   Left anterior fascicular block     Imaging Results              X-Ray Chest 1 View (Final result)  Result time 01/06/23 10:06:23      Final result by Magen Darnell MD (01/06/23 10:06:23)                   Impression:      Suspected chronic interstitial changes of the lungs along with a possible small right pleural effusion.      Electronically signed by: Magen Darnell MD  Date:    01/06/2023  Time:    10:06               Narrative:    EXAMINATION:  XR CHEST 1 VIEW    CLINICAL HISTORY:  Cough    COMPARISON:  12/29/2022    FINDINGS:  Cardiac silhouette does not appear enlarged.  Thoracic aorta is calcified.  There is sternotomy change.  Diffuse reticular opacities of the lungs are similar to the previous exam, possibly reflecting fibrotic change.  Blunting of the right costophrenic angle may represent a small pleural effusion.  No focal consolidation.                                       Medications - No data to display              ED Course as of 01/06/23 1021   Fri Jan 06, 2023   1018 Spoke to PCP who recommends sitting patient home with change in antibiotics patient has good family support.  Advised to follow-up to ensure that patient improves in sx [HD]      ED Course User Index  [HD] Arvin Hopson MD                 Clinical Impression:   Final diagnoses:  [R05.9] Cough  [J18.9] Pneumonia of right lung due to infectious organism, unspecified part of lung (Primary)        ED Disposition Condition    Discharge Stable          ED Prescriptions       Medication Sig Dispense Start Date End Date Auth. Provider    levoFLOXacin (LEVAQUIN) 500 MG tablet Take 1 tablet (500 mg total) by mouth once daily. for 5 days 5 tablet 1/6/2023 1/11/2023 Arvin Hopson MD          Follow-up Information       Follow up With Specialties Details Why Contact Info    Radha Morales MD Internal Medicine In 2 days  [facility] [facility] [address]  [phone]               Arvin Hopson MD  01/06/23 1021

## 2023-03-13 PROBLEM — Z00.00 WELLNESS EXAMINATION: Status: RESOLVED | Noted: 2022-01-01 | Resolved: 2023-01-01

## 2023-05-03 NOTE — PLAN OF CARE
[facility] - Intensive Care  Initial Discharge Assessment       Primary Care Provider: Radha Morales MD    Admission Diagnosis: Lactic acidosis [E87.20]  Respiratory insufficiency [R06.89]  SOB (shortness of breath) [R06.02]  Chest pain [R07.9]  Pneumonia due to infectious organism, unspecified laterality, unspecified part of lung [J18.9]    Admission Date: 5/3/2023  Expected Discharge Date:     Discharge Barriers Identified: None    Payor: HU[employer] MANAGED MEDICARE / Plan: HU[employer] MEDICARE PPO / Product Type: Medicare Advantage /     Extended Emergency Contact Information  Primary Emergency Contact: EBONI ABBASI  Mobile Phone: [phone]  Relation: Daughter  Preferred language: English   needed? No    Discharge Plan A: Home with family  Discharge Plan B: Home with family, Home Health      [id number] [address] [facility]  [address] [address]  Phone: [phone] Fax: [phone]      Initial Assessment (most recent)       Adult Discharge Assessment - 05/03/23 1458          Discharge Assessment    Assessment Type Discharge Planning Assessment     Confirmed/corrected address, phone number and insurance Yes     Confirmed Demographics Correct on Facesheet     Source of Information patient;family   Eboni Abbasi [phone]    Communicated JAMARI with patient/caregiver Date not available/Unable to determine     Reason For Admission short of breath, coughin     People in Home child(manolo), adult     Facility Arrived From: home lives with daughter Nadia Cisneros     Do you expect to return to your current living situation? Yes     Do you have help at home or someone to help you manage your care at home? Yes     Who are your caregiver(s) and their phone number(s)? Nadia     Prior to hospitilization cognitive status: Alert/Oriented     Current cognitive status: Alert/Oriented     Walking or Climbing Stairs  ambulation difficulty, requires equipment     Mobility Management has rollator, daughter requesting wheelchair,     Dressing/Bathing bathing difficulty, dependent;dressing difficulty, dependent     Home Layout Able to live on 1st floor     Equipment Currently Used at Home nebulizer;rollator     Readmission within 30 days? No     Patient currently being followed by outpatient case management? Unable to determine (comments)     Do you currently have service(s) that help you manage your care at home? No     Do you take prescription medications? Yes     Do you have any problems affording any of your prescribed medications? No     Is the patient taking medications as prescribed? yes     Who is going to help you get home at discharge? daughter and family     How do you get to doctors appointments? family or friend will provide     Are you on dialysis? No     Do you take coumadin? No     Discharge Plan A Home with family     Discharge Plan B Home with family;Home Health     DME Needed Upon Discharge  wheelchair     Discharge Plan discussed with: Patient;Adult children     Discharge Barriers Identified None                 Spoke to patient and daughter, Eboni Abbasi who is at bedside.  Daughter is patient's main caretaker.  Patient identifies daughter as main contact.  Patient oriented, knows her name and confirms date of birth.  Daughter states patient has Rolator at home, has nebulizer, does not have wheel chair, feels patient needs this when away from home for trips to  MD office.  Daughter states patient is dependent for assistance to get out of bed, but walks with Rolator and supervision.  [facility] patient does not get up every day, but most days she does.  Daughter states patient will be returning home when discharged, is open to home health coming, but declines consideration of SNF.  Patient anticipates discharging back to daughter's home.

## 2023-05-03 NOTE — ED PROVIDER NOTES
Encounter Date: 5/3/2023       History     Chief Complaint   Patient presents with    Shortness of Breath     Sent here by Dr. Josue' office for SOB.  Room Sat at clinic in the upper 70s.  Put on 2L and got to 90s.  Recent travel to [place] via plane.  Reports thick, clear phlegm and cough  Denies fever.     89-year-old female with history of CABG, hypertension, high cholesterol sent in from PCP office for shortness of breath with oxygen reading 70% with improvement on 2 L. of note, patient has been having cough with thick sputum for the past few days.  Patient endorses some chest pain with coughing only.  Patient recently flew from [place] which is a 2-3 hour flight.  Denies any leg swelling, fever, trauma,    Review of patient's allergies indicates:  No Known Allergies  Past Medical History:   Diagnosis Date    Anemia     Pernicious    B12 deficiency     CAD (coronary artery disease)     Cellulitis of right leg     Cerumen impaction 08/03/2021    Constipation     Diarrhea     Dizziness     Dysphagia     Gastroenteritis due to group B Shigella     GERD (gastroesophageal reflux disease)     Hyperlipidemia     Hypertension     Pneumonia     Thoracic back pain      Past Surgical History:   Procedure Laterality Date    ABDOMINAL SURGERY      Rib Surgery d/t h/o accident    CHOLECYSTECTOMY      COLONOSCOPY  2012    CORONARY ARTERY BYPASS GRAFT  2000,2006    x2    CYSTOSCOPY WITH URETHRAL DILATION      HYSTERECTOMY      Complete      Family History   Problem Relation Age of Onset    Stomach cancer Mother     Heart disease Father      Social History     Tobacco Use    Smoking status: Never    Smokeless tobacco: Never   Substance Use Topics    Alcohol use: Not Currently    Drug use: Not Currently     Review of Systems   Constitutional: Negative.    HENT: Negative.     Respiratory:  Positive for cough and shortness of breath.    Cardiovascular: Negative.    Gastrointestinal: Negative.    Genitourinary: Negative.     Musculoskeletal: Negative.    Skin: Negative.    Neurological: Negative.    Psychiatric/Behavioral: Negative.     All other systems reviewed and are negative.    Physical Exam     Initial Vitals   BP Pulse Resp Temp SpO2   05/03/23 1021 05/03/23 1024 05/03/23 1021 05/03/23 1024 05/03/23 1021   (!) 181/81 85 (!) 24 97.5 °F (36.4 °C) (!) 90 %      MAP       --                Physical Exam    Nursing note and vitals reviewed.  Constitutional: Vital signs are normal.   HENT:   Head: Normocephalic and atraumatic.   Eyes: Conjunctivae and lids are normal.   Neck: Trachea normal. Neck supple.   Cardiovascular:  Normal rate, regular rhythm, normal heart sounds, intact distal pulses and normal pulses.           No murmur heard.  Pulmonary/Chest: Breath sounds normal. No respiratory distress. She has no wheezes. She has no rales.   Abdominal: Abdomen is soft. Bowel sounds are normal.   Musculoskeletal:         General: Normal range of motion.      Cervical back: Neck supple.     Neurological: She is alert and oriented to person, place, and time. She has normal strength. No cranial nerve deficit or sensory deficit.   Skin: Skin is warm. Capillary refill takes less than 2 seconds.   Psychiatric: She has a normal mood and affect. Her speech is normal. Thought content normal.       ED Course   Critical Care    Date/Time: 5/3/2023 12:30 PM  Performed by: Arvin Hopson MD  Authorized by: Arvin Hopson MD   Direct patient critical care time: 15 minutes  Additional history critical care time: 10 minutes  Ordering / reviewing critical care time: 5 minutes  Documentation critical care time: 5 minutes  Other critical care time: 5 minutes  Total critical care time (exclusive of procedural time) : 40 minutes      Labs Reviewed   CBC W/ AUTO DIFFERENTIAL - Abnormal; Notable for the following components:       Result Value    MCH 31.2 (*)     RDW 15.0 (*)     Gran % 75.4 (*)     All other components within normal limits   COMPREHENSIVE  METABOLIC PANEL - Abnormal; Notable for the following components:    Sodium 134 (*)     CO2 30 (*)     Glucose 129 (*)     Albumin 3.1 (*)     AST 69 (*)     Anion Gap 5 (*)     eGFR 51.9 (*)     All other components within normal limits   LACTIC ACID, PLASMA - Abnormal; Notable for the following components:    Lactate (Lactic Acid) 4.4 (*)     All other components within normal limits    Narrative:       LA critical result(s) called and verbal readback obtained from   Kenneth Bhatt RN by [facility] 05/03/2023 11:53   CULTURE, BLOOD   CULTURE, BLOOD   TROPONIN I HIGH SENSITIVITY   NT-PRO NATRIURETIC PEPTIDE   PROTIME-INR   APTT   LACTIC ACID, PLASMA     EKG Readings: (Independently Interpreted)   Initial Reading: No STEMI. Rhythm: Sinus Arrhythmia. Axis: Left Axis Deviation.   ECG Results              EKG 12-lead (Final result)  Result time 05/03/23 11:43:55      Final result by Interface, Lab [facility] (05/03/23 11:43:55)                   Narrative:    Test Reason : R06.02,    Vent. Rate : 079 BPM     Atrial Rate : 079 BPM     P-R Int : 198 ms          QRS Dur : 088 ms      QT Int : 356 ms       P-R-T Axes : 075 -39 116 degrees     QTc Int : 408 ms    Sinus rhythm with marked sinus arrythmia  Left axis deviation  LVH with repolarization abnormality  Low anterior forces  Abnormal ECG  When compared with ECG of 06-JAN-2023 09:24,  No significant change was found  Confirmed by Antwan Reynoso MD ([id number]) on 5/3/2023 11:43:45 AM    Referred By: [email or web address]   SELF           Confirmed By:Antwan Reynoso MD                                  Imaging Results              X-Ray Chest 1 View (Final result)  Result time 05/03/23 11:04:04      Final result by Haydee Bellamy MD (05/03/23 11:04:04)                   Impression:      Suspect chronic interstitial fibrosis stable unchanged.  Cannot exclude superimposed mild interstitial infiltrate or edema on the right      Electronically signed by: Haydee Bellamy  MD  Date:    05/03/2023  Time:    11:04               Narrative:    EXAMINATION:  XR CHEST 1 VIEW    CLINICAL HISTORY:  Shortness of breath    TECHNIQUE:  portable chest x-ray    COMPARISON:  01/06/2023.    FINDINGS:  Diffuse chronic interstitial changes.  Elevated left hemidiaphragm.  There is chronic interstitial fibrosis throughout the lungs.  Cannot exclude superimposed mild interstitial infiltrate or edema on the right.                                       Medications   cefTRIAXone (ROCEPHIN) 1 g in dextrose 5 % in water (D5W) 5 % 50 mL IVPB (MB+) (1 g Intravenous New Bag 5/3/23 1218)   azithromycin (ZITHROMAX) 500 mg in dextrose 5 % (D5W) 250 mL IVPB (Vial-Mate) (has no administration in time range)   sodium chloride 0.9% bolus 1,000 mL 1,000 mL (1,000 mLs Intravenous New Bag 5/3/23 1218)   acetaminophen tablet 650 mg (has no administration in time range)   lactated ringers infusion (has no administration in time range)   albuterol-ipratropium 2.5 mg-0.5 mg/3 mL nebulizer solution 3 mL (3 mLs Nebulization Given 5/3/23 1056)     Medical Decision Making:   Initial Assessment:   89-year-old female with history of CABG, hypertension, high cholesterol sent in from PCP office for shortness of breath with oxygen reading 70% with improvement on 2 L. afebrile saturating 95% on room air.  Patient mildly tachypneic.  Will try DuoNeb.  Patient does not have history of COPD.  Will get screening labs to rule out pulmonary edema, pneumonia, ACS,  Clinical Tests:   Lab Tests: Ordered and Reviewed  The following lab test(s) were unremarkable: CBC, CMP, PT, PTT, Troponin and Lactate  Radiological Study: Ordered and Reviewed  Medical Tests: Reviewed and Ordered           ED Course as of 05/03/23 1234   Wed May 03, 2023   1137 Labs imaging noted.  Possible superimposed pneumonia.  Patient has been coughing for the past few days with sputum production.  Consider pulmonary edema but patient not tachy and saturating above 94% on  room air with signs of infection.  Will hold off on CTA.  Patient oxygen saturation has been above 94 % on room air.  Extremity appears cold so reading at PCP may have been incorrect. [HD]      ED Course User Index  [HD] Arvin Hopson MD                 Clinical Impression:   Final diagnoses:  [R07.9] Chest pain  [R06.02] SOB (shortness of breath)  [J18.9] Pneumonia due to infectious organism, unspecified laterality, unspecified part of lung (Primary)  [R06.89] Respiratory insufficiency  [E87.20] Lactic acidosis        ED Disposition Condition    Admit Fair                Arvin Hopson MD  05/03/23 1231       Arvin Hopson MD  05/03/23 1234

## 2023-05-03 NOTE — NURSING
Pt very hard of hearing.  Answers questions appropriately but has difficulty hearing.  Pt has a hearing aid in her right ear.

## 2023-05-03 NOTE — PROGRESS NOTES
Pharmacist Renal Dose Adjustment Note    Nadia Cisneros is a 89 y.o. female being treated with the medication Enoxaparin    Patient Data:    Vital Signs (Most Recent):  Temp: 97.5 °F (36.4 °C) (05/03/23 1024)  Pulse: 85 (05/03/23 1107)  Resp: 20 (05/03/23 1107)  BP: 135/62 (05/03/23 1032)  SpO2: (!) 92 % (05/03/23 1107)   Vital Signs (72h Range):  Temp:  [97.5 °F (36.4 °C)-97.7 °F (36.5 °C)]   Pulse:  []   Resp:  [16-24]   BP: ()/(60-81)   SpO2:  [80 %-96 %]      Recent Labs   Lab 05/03/23  1039   CREATININE 1.0     Serum creatinine: 1 mg/dL 05/03/23 1039  Estimated creatinine clearance: 21.7 mL/min    Medication: Enoxaparin dose: 40 mg frequency BID will be changed to medication: Enoxaparin dose: 40 mg frequency:QD due to crcl 10-30 ml/min (max 1mg/kg QD for treatment)    Pharmacist's Name: Javier St  Pharmacist's Extension: [phone]

## 2023-05-04 PROBLEM — E87.6 HYPOKALEMIA: Status: ACTIVE | Noted: 2023-01-01

## 2023-05-04 PROBLEM — R06.89 RESPIRATORY INSUFFICIENCY: Status: ACTIVE | Noted: 2023-01-01

## 2023-05-04 PROBLEM — J18.9 PNEUMONIA DUE TO INFECTIOUS ORGANISM: Status: ACTIVE | Noted: 2023-01-01

## 2023-05-04 PROBLEM — E87.20 LACTIC ACIDOSIS: Status: ACTIVE | Noted: 2023-01-01

## 2023-05-04 NOTE — PLAN OF CARE
Patient rested fairly well last night. VSS, afebrile. Patient's heart rhythm is sinus arrhyhtmia  first degree block with frequent PAC's .

## 2023-05-04 NOTE — PLAN OF CARE
05/04/23 1027   Post-Acute Status   Post-Acute Authorization E   HME Status Referrals Sent  (Sent to [facility] for review)

## 2023-05-04 NOTE — PLAN OF CARE
05/04/23 1451   Post-Acute Status   Post-Acute Authorization HME   HME Status (!) Pending Delivery     Mere Petersen called. Wheelchair will be delivered to this hospital on 5/5/23.

## 2023-05-04 NOTE — NURSING
Assumed care. Assessment unchanged. Alert. PIV intact with IVF infusing without difficulty. Family at side. No needs/concerns noted at present.

## 2023-05-04 NOTE — HPI
Patient presents with c/o productive cough (thick clear) and SOB that began yesterday.  Denies fever.  In office flu/Covid negative.  States she did recently travel to [place].  States she flew to [place] on the 29th  and flew back home Sunday.  Patient O2 sat initially mid 70s on room air.  Slowly improved to low 90's after apx 15-20 minutes on 2L O2 via NC initiated upon arrival to office.  Patient does not have home O2 at this time.  In office, pt had resp insuuficincy with catlike noise when breathing and poor airflow  This am, pt reports her breathign is improving - no further noises with inspiration or expiration.  Less sob noted.

## 2023-05-04 NOTE — PLAN OF CARE
Cont to encourage PO intake. Cont with Abx. IVF infusing without difficulty. RA. Family at side. DNR status

## 2023-05-04 NOTE — H&P
[facility]  History & Physical    Patient Name: Nadia Cisneros  MRN: [id number]  Patient Class: IP- Inpatient  Admission Date: 5/3/2023  Attending Physician: Radha Morales MD   Primary Care Provider: Radha Morales MD         Patient information was obtained from patient, past medical records and ER records.     Subjective:     Principal Problem:Respiratory insufficiency    Chief Complaint:   Chief Complaint   Patient presents with    Shortness of Breath     Sent here by Dr. Josue' office for SOB.  Room Sat at clinic in the upper 70s.  Put on 2L and got to 90s.  Recent travel to [place] via plane.  Reports thick, clear phlegm and cough  Denies fever.        HPI: Patient presents with c/o productive cough (thick clear) and SOB that began yesterday.  Denies fever.  In office flu/Covid negative.  States she did recently travel to [place].  States she flew to [place] on the 29th  and flew back home Sunday.  Patient O2 sat initially mid 70s on room air.  Slowly improved to low 90's after apx 15-20 minutes on 2L O2 via NC initiated upon arrival to office.  Patient does not have home O2 at this time.  In office, pt had resp insuuficincy with catlike noise when breathing and poor airflow  This am, pt reports her breathign is improving - no further noises with inspiration or expiration.  Less sob noted.      Past Medical History:   Diagnosis Date    Anemia     Pernicious    B12 deficiency     CAD (coronary artery disease)     Cellulitis of right leg     Cerumen impaction 08/03/2021    Constipation     Diarrhea     Dizziness     Dysphagia     Gastroenteritis due to group B Shigella     GERD (gastroesophageal reflux disease)     Hyperlipidemia     Hypertension     Pneumonia     Thoracic back pain        Past Surgical History:   Procedure Laterality Date    ABDOMINAL SURGERY      Rib Surgery d/t h/o accident    CHOLECYSTECTOMY      COLONOSCOPY  2012    CORONARY  ARTERY BYPASS GRAFT  2000,2006    x2    CYSTOSCOPY WITH URETHRAL DILATION      HYSTERECTOMY      Complete        Review of patient's allergies indicates:  No Known Allergies    No current facility-administered medications on file prior to encounter.     Current Outpatient Medications on File Prior to Encounter   Medication Sig    albuterol (PROVENTIL) 2.5 mg /3 mL (0.083 %) nebulizer solution inhale 3 milliliters (2.5 mg) by nebulization route 3 times per day as needed    aspirin (ECOTRIN) 81 MG EC tablet Take 81 mg by mouth once daily.    cholecalciferol, vitamin D3, (VITAMIN D3) 50 mcg (2,000 unit) Cap capsule 1 capsule.    cyanocobalamin, vitamin B-12, (VITAMIN B-12 ORAL) 1,000 mcg.    hydroCHLOROthiazide (HYDRODIURIL) 25 MG tablet Take 25 mg by mouth once daily.    metoprolol succinate (TOPROL-XL) 25 MG 24 hr tablet Take 25 mg by mouth.    NIFEdipine (PROCARDIA-XL) 30 MG (OSM) 24 hr tablet Take 30 mg by mouth.    nitrofurantoin (MACRODANTIN) 50 MG capsule TAKE 1 CAPSULE(50 MG) BY MOUTH EVERY DAY AT BEDTIME WITH FOOD    potassium chloride (K-TAB) 20 mEq Take 20 mEq by mouth.    pravastatin (PRAVACHOL) 80 MG tablet Take 80 mg by mouth once daily.    silver sulfADIAZINE 1% (SILVADENE) 1 % cream Apply topically once daily.    cefUROXime (CEFTIN) 250 MG tablet Take 1 tablet (250 mg total) by mouth 2 (two) times daily.    doxycycline (VIBRAMYCIN) 100 MG Cap Take 100 mg by mouth 2 (two) times daily.    metoprolol tartrate (LOPRESSOR) 25 MG tablet Take 25 mg by mouth once daily.     Family History       Problem Relation (Age of Onset)    Heart disease Father    Stomach cancer Mother          Tobacco Use    Smoking status: Never    Smokeless tobacco: Never   Substance and Sexual Activity    Alcohol use: Not Currently    Drug use: Not Currently    Sexual activity: Not Currently     Review of Systems   Constitutional:  Positive for activity change and fatigue. Negative for chills and fever.   HENT:   Positive for hearing loss. Negative for sinus pressure and sore throat.    Respiratory:  Positive for cough, shortness of breath, wheezing and stridor.    Cardiovascular:  Negative for chest pain, palpitations and leg swelling.   Gastrointestinal:  Negative for abdominal pain, diarrhea, nausea and vomiting.   Musculoskeletal:  Positive for arthralgias.   Skin:  Negative for rash and wound.   Neurological:  Positive for weakness.   Objective:     Vital Signs (Most Recent):  Temp: 97 °F (36.1 °C) (05/04/23 0702)  Pulse: 89 (05/04/23 0745)  Resp: (!) 40 (05/04/23 0745)  BP: 136/63 (05/04/23 0745)  SpO2: 97 % (05/04/23 0745)   Vital Signs (24h Range):  Temp:  [96.4 °F (35.8 °C)-97.7 °F (36.5 °C)] 97 °F (36.1 °C)  Pulse:  [] 89  Resp:  [16-49] 40  SpO2:  [80 %-100 %] 97 %  BP: ()/(42-81) 136/63     Weight: 40.8 kg (90 lb)  Body mass index is 17.58 kg/m².     Physical Exam  Constitutional:       Appearance: She is ill-appearing.   HENT:      Ears:      Comments: Hard of hearing     Mouth/Throat:      Mouth: Mucous membranes are moist.   Eyes:      Extraocular Movements: Extraocular movements intact.      Pupils: Pupils are equal, round, and reactive to light.   Cardiovascular:      Rate and Rhythm: Normal rate and regular rhythm.   Pulmonary:      Effort: Pulmonary effort is normal.      Breath sounds: No wheezing or rhonchi.      Comments: Imrpoved air flow with coarse breath sounds  Abdominal:      General: Bowel sounds are normal. There is no distension.      Palpations: Abdomen is soft.      Tenderness: There is no abdominal tenderness.   Musculoskeletal:         General: Normal range of motion.   Skin:     General: Skin is warm and dry.      Capillary Refill: Capillary refill takes less than 2 seconds.   Neurological:      General: No focal deficit present.      Mental Status: She is alert and oriented to person, place, and time.            CRANIAL NERVES     CN III, IV, VI   Pupils are equal, round, and  reactive to light.     Significant Labs: All pertinent labs within the past 24 hours have been reviewed.  Recent Lab Results  (Last 5 results in the past 24 hours)        05/04/23  0411   05/03/23  2249   05/03/23  1518   05/03/23  1048   05/03/23  1039        Albumin 2.3         3.1       Alkaline Phosphatase 54         70       ALT 26         35       Anion Gap 4         5       aPTT       23.3  Comment: aPTT therapeutic range = 39-69 seconds         AST 43         69       Bands 9.0               Baso # CANCELED  Comment: Result canceled by the ancillary.         0.04       Basophil % 0.0         0.4       BILIRUBIN TOTAL 0.6  Comment: For infants and newborns, interpretation of results should be based  on gestational age, weight and in agreement with clinical  observations.    Premature Infant recommended reference ranges:  Up to 24 hours.............<8.0 mg/dL  Up to 48 hours............<12.0 mg/dL  3-5 days..................<15.0 mg/dL  6-29 days.................<15.0 mg/dL    For patients on Eltrombopag therapy, use of Dimension [place] TBIL is   not   recommended.           0.6  Comment: For infants and newborns, interpretation of results should be based  on gestational age, weight and in agreement with clinical  observations.    Premature Infant recommended reference ranges:  Up to 24 hours.............<8.0 mg/dL  Up to 48 hours............<12.0 mg/dL  3-5 days..................<15.0 mg/dL  6-29 days.................<15.0 mg/dL    For patients on Eltrombopag therapy, use of Dimension [place] TBIL is   not   recommended.         Blood Culture, Routine       No Growth to date  [P]   No Growth to date  [P]       BUN 11         15       Calcium 8.3         9.2       Chloride 100         99       CO2 31         30       Creatinine 0.7         1.0       Differential Method Manual  Comment: CORRECTED RESULT; previously reported as Automated on 05/04/2023 at   05:23.    [C]         Automated       eGFR >60.0          51.9       Eos # CANCELED  Comment: Result canceled by the ancillary.         0.1       Eosinophil % 0.0         1.0       Glucose 136         129       Gran # (ANC)         7.6       Gran % 77.0         75.4       Hematocrit 34.6         42.6       Hemoglobin 12.0         14.4       Hypo Occasional               Immature Grans (Abs) CANCELED  Comment: Mild elevation in immature granulocytes is non specific and   can be seen in a variety of conditions including stress response,   acute inflammation, trauma and pregnancy. Correlation with other   laboratory and clinical findings is essential.    Result canceled by the ancillary.           0.03  Comment: Mild elevation in immature granulocytes is non specific and   can be seen in a variety of conditions including stress response,   acute inflammation, trauma and pregnancy. Correlation with other   laboratory and clinical findings is essential.         Immature Granulocytes CANCELED  Comment: Result canceled by the ancillary.         0.3       INR       1.2  Comment: Coumadin Therapy:  2.0 - 3.0 for INR for all indicators except mechanical heart valves  and antiphospholipid syndromes which should use 2.5 - 3.5.           Lactate, Joe   3.4   5.9  Comment: LA critical result(s) called and verbal readback obtained from   ELLIOTT Hanley by [facility] 05/03/2023 16:55     4.4  Comment: LA critical result(s) called and verbal readback obtained from   Kenneth Bhatt RN by [facility] 05/03/2023 11:53           Lymph # CANCELED  Comment: Result canceled by the ancillary.         1.9       Lymph % 10.0         18.7       MCH 31.2         31.2       MCHC 34.7         33.8       MCV 90         92       Mono # CANCELED  Comment: Result canceled by the ancillary.         0.4       Mono % 4.0         4.2       MPV 10.5         10.5       nRBC 0         0       NT-proBNP         1242       Platelet Estimate Appears normal               Platelets 131         185       Potassium 3.1         4.0        PROTEIN TOTAL 6.6         8.3       Protime       12.1         RBC 3.85         4.62       RDW 14.6         15.0       Sodium 135         134       Troponin I High Sensitivity         56.0       WBC 16.48         10.14                               [P] - Preliminary Result [C] - Corrected Result              Significant Imaging: I have reviewed all pertinent imaging results/findings within the past 24 hours.    Assessment/Plan:     * Respiratory insufficiency  Pt admitted with hypoxemia and dificulty with sob- started on iv abxs, nebs, iv steroids and O2 protocal - cta done showed no PE      Hypokalemia  Replace potassium bid      Pneumonia due to infectious organism  Cont iv abxs and nebs  O2 protocal      Lactic acidosis  improved with ivfs      Gastroesophageal reflux  Start pepcid      Arteriosclerosis of coronary artery  cotn b-blocker, asa and statin        VTE Risk Mitigation (From admission, onward)         Ordered     enoxaparin injection 40 mg  Daily         05/03/23 1310     IP VTE HIGH RISK PATIENT  Once         05/03/23 1343     Place sequential compression device  Until discontinued         05/03/23 1343                           Radha Morales MD  Department of Hospital Medicine  [facility] - Intensive Care

## 2023-05-04 NOTE — ACP (ADVANCE CARE PLANNING)
Spoke with dtg - family wishes to make her a dnr if her heart or lungs would stop.  They do not want aggressive measures of cpr or intubation

## 2023-05-04 NOTE — SUBJECTIVE & OBJECTIVE
Past Medical History:   Diagnosis Date    Anemia     Pernicious    B12 deficiency     CAD (coronary artery disease)     Cellulitis of right leg     Cerumen impaction 08/03/2021    Constipation     Diarrhea     Dizziness     Dysphagia     Gastroenteritis due to group B Shigella     GERD (gastroesophageal reflux disease)     Hyperlipidemia     Hypertension     Pneumonia     Thoracic back pain        Past Surgical History:   Procedure Laterality Date    ABDOMINAL SURGERY      Rib Surgery d/t h/o accident    CHOLECYSTECTOMY      COLONOSCOPY  2012    CORONARY ARTERY BYPASS GRAFT  2000,2006    x2    CYSTOSCOPY WITH URETHRAL DILATION      HYSTERECTOMY      Complete        Review of patient's allergies indicates:  No Known Allergies    No current facility-administered medications on file prior to encounter.     Current Outpatient Medications on File Prior to Encounter   Medication Sig    albuterol (PROVENTIL) 2.5 mg /3 mL (0.083 %) nebulizer solution inhale 3 milliliters (2.5 mg) by nebulization route 3 times per day as needed    aspirin (ECOTRIN) 81 MG EC tablet Take 81 mg by mouth once daily.    cholecalciferol, vitamin D3, (VITAMIN D3) 50 mcg (2,000 unit) Cap capsule 1 capsule.    cyanocobalamin, vitamin B-12, (VITAMIN B-12 ORAL) 1,000 mcg.    hydroCHLOROthiazide (HYDRODIURIL) 25 MG tablet Take 25 mg by mouth once daily.    metoprolol succinate (TOPROL-XL) 25 MG 24 hr tablet Take 25 mg by mouth.    NIFEdipine (PROCARDIA-XL) 30 MG (OSM) 24 hr tablet Take 30 mg by mouth.    nitrofurantoin (MACRODANTIN) 50 MG capsule TAKE 1 CAPSULE(50 MG) BY MOUTH EVERY DAY AT BEDTIME WITH FOOD    potassium chloride (K-TAB) 20 mEq Take 20 mEq by mouth.    pravastatin (PRAVACHOL) 80 MG tablet Take 80 mg by mouth once daily.    silver sulfADIAZINE 1% (SILVADENE) 1 % cream Apply topically once daily.    cefUROXime (CEFTIN) 250 MG tablet Take 1 tablet (250 mg total) by mouth 2 (two) times daily.    doxycycline (VIBRAMYCIN) 100 MG Cap Take 100  mg by mouth 2 (two) times daily.    metoprolol tartrate (LOPRESSOR) 25 MG tablet Take 25 mg by mouth once daily.     Family History       Problem Relation (Age of Onset)    Heart disease Father    Stomach cancer Mother          Tobacco Use    Smoking status: Never    Smokeless tobacco: Never   Substance and Sexual Activity    Alcohol use: Not Currently    Drug use: Not Currently    Sexual activity: Not Currently     Review of Systems   Constitutional:  Positive for activity change and fatigue. Negative for chills and fever.   HENT:  Positive for hearing loss. Negative for sinus pressure and sore throat.    Respiratory:  Positive for cough, shortness of breath, wheezing and stridor.    Cardiovascular:  Negative for chest pain, palpitations and leg swelling.   Gastrointestinal:  Negative for abdominal pain, diarrhea, nausea and vomiting.   Musculoskeletal:  Positive for arthralgias.   Skin:  Negative for rash and wound.   Neurological:  Positive for weakness.   Objective:     Vital Signs (Most Recent):  Temp: 97 °F (36.1 °C) (05/04/23 0702)  Pulse: 89 (05/04/23 0745)  Resp: (!) 40 (05/04/23 0745)  BP: 136/63 (05/04/23 0745)  SpO2: 97 % (05/04/23 0745)   Vital Signs (24h Range):  Temp:  [96.4 °F (35.8 °C)-97.7 °F (36.5 °C)] 97 °F (36.1 °C)  Pulse:  [] 89  Resp:  [16-49] 40  SpO2:  [80 %-100 %] 97 %  BP: ()/(42-81) 136/63     Weight: 40.8 kg (90 lb)  Body mass index is 17.58 kg/m².     Physical Exam  Constitutional:       Appearance: She is ill-appearing.   HENT:      Ears:      Comments: Hard of hearing     Mouth/Throat:      Mouth: Mucous membranes are moist.   Eyes:      Extraocular Movements: Extraocular movements intact.      Pupils: Pupils are equal, round, and reactive to light.   Cardiovascular:      Rate and Rhythm: Normal rate and regular rhythm.   Pulmonary:      Effort: Pulmonary effort is normal.      Breath sounds: No wheezing or rhonchi.      Comments: Imrpoved air flow with coarse breath  sounds  Abdominal:      General: Bowel sounds are normal. There is no distension.      Palpations: Abdomen is soft.      Tenderness: There is no abdominal tenderness.   Musculoskeletal:         General: Normal range of motion.   Skin:     General: Skin is warm and dry.      Capillary Refill: Capillary refill takes less than 2 seconds.   Neurological:      General: No focal deficit present.      Mental Status: She is alert and oriented to person, place, and time.            CRANIAL NERVES     CN III, IV, VI   Pupils are equal, round, and reactive to light.     Significant Labs: All pertinent labs within the past 24 hours have been reviewed.  Recent Lab Results  (Last 5 results in the past 24 hours)        05/04/23  0411   05/03/23  2249   05/03/23  1518   05/03/23  1048   05/03/23  1039        Albumin 2.3         3.1       Alkaline Phosphatase 54         70       ALT 26         35       Anion Gap 4         5       aPTT       23.3  Comment: aPTT therapeutic range = 39-69 seconds         AST 43         69       Bands 9.0               Baso # CANCELED  Comment: Result canceled by the ancillary.         0.04       Basophil % 0.0         0.4       BILIRUBIN TOTAL 0.6  Comment: For infants and newborns, interpretation of results should be based  on gestational age, weight and in agreement with clinical  observations.    Premature Infant recommended reference ranges:  Up to 24 hours.............<8.0 mg/dL  Up to 48 hours............<12.0 mg/dL  3-5 days..................<15.0 mg/dL  6-29 days.................<15.0 mg/dL    For patients on Eltrombopag therapy, use of Dimension [place] TBIL is   not   recommended.           0.6  Comment: For infants and newborns, interpretation of results should be based  on gestational age, weight and in agreement with clinical  observations.    Premature Infant recommended reference ranges:  Up to 24 hours.............<8.0 mg/dL  Up to 48 hours............<12.0 mg/dL  3-5  days..................<15.0 mg/dL  6-29 days.................<15.0 mg/dL    For patients on Eltrombopag therapy, use of Dimension [place] TBIL is   not   recommended.         Blood Culture, Routine       No Growth to date  [P]   No Growth to date  [P]       BUN 11         15       Calcium 8.3         9.2       Chloride 100         99       CO2 31         30       Creatinine 0.7         1.0       Differential Method Manual  Comment: CORRECTED RESULT; previously reported as Automated on 05/04/2023 at   05:23.    [C]         Automated       eGFR >60.0         51.9       Eos # CANCELED  Comment: Result canceled by the ancillary.         0.1       Eosinophil % 0.0         1.0       Glucose 136         129       Gran # (ANC)         7.6       Gran % 77.0         75.4       Hematocrit 34.6         42.6       Hemoglobin 12.0         14.4       Hypo Occasional               Immature Grans (Abs) CANCELED  Comment: Mild elevation in immature granulocytes is non specific and   can be seen in a variety of conditions including stress response,   acute inflammation, trauma and pregnancy. Correlation with other   laboratory and clinical findings is essential.    Result canceled by the ancillary.           0.03  Comment: Mild elevation in immature granulocytes is non specific and   can be seen in a variety of conditions including stress response,   acute inflammation, trauma and pregnancy. Correlation with other   laboratory and clinical findings is essential.         Immature Granulocytes CANCELED  Comment: Result canceled by the ancillary.         0.3       INR       1.2  Comment: Coumadin Therapy:  2.0 - 3.0 for INR for all indicators except mechanical heart valves  and antiphospholipid syndromes which should use 2.5 - 3.5.           Lactate, Joe   3.4   5.9  Comment: LA critical result(s) called and verbal readback obtained from   ELLIOTT Hanley by [facility] 05/03/2023 16:55     4.4  Comment: LA critical result(s) called and verbal readback  obtained from   Kenneth Bhatt RN by [facility] 05/03/2023 11:53           Lymph # CANCELED  Comment: Result canceled by the ancillary.         1.9       Lymph % 10.0         18.7       MCH 31.2         31.2       MCHC 34.7         33.8       MCV 90         92       Mono # CANCELED  Comment: Result canceled by the ancillary.         0.4       Mono % 4.0         4.2       MPV 10.5         10.5       nRBC 0         0       NT-proBNP         1242       Platelet Estimate Appears normal               Platelets 131         185       Potassium 3.1         4.0       PROTEIN TOTAL 6.6         8.3       Protime       12.1         RBC 3.85         4.62       RDW 14.6         15.0       Sodium 135         134       Troponin I High Sensitivity         56.0       WBC 16.48         10.14                               [P] - Preliminary Result [C] - Corrected Result              Significant Imaging: I have reviewed all pertinent imaging results/findings within the past 24 hours.

## 2023-05-04 NOTE — ASSESSMENT & PLAN NOTE
Pt admitted with hypoxemia and dificulty with sob- started on iv abxs, nebs, iv steroids and O2 protocal - cta done showed no PE

## 2023-05-05 NOTE — HOSPITAL COURSE
5/5 ND patient feeling better this a.m..  Off of all O2.  Limited coughing.  Did have some sundowning last night but this morning is awake and alert answering questions appropriately.  Spoke with daughter and granddaughter they have equipment at home needed to help care for patient with a nebulizer and can do the care that she needs at this time will DC home with p.o. antibiotics nebs t.i.d. and cough syrup twice a day

## 2023-05-05 NOTE — NURSING
Pt bradycardic, asymptomatic while asleep (48 BPM) then immediately back up (64 BPM). Continue to observe.

## 2023-05-05 NOTE — DISCHARGE SUMMARY
[facility] Medicine  Discharge Summary      Patient Name: Nadia Cisneros  MRN: [id number]  QUETA: [id number]  Patient Class: IP- Inpatient  Admission Date: 5/3/2023  Hospital Length of Stay: 2 days  Discharge Date and Time:  05/05/2023 8:11 AM  Attending Physician: Yina Morales MD   Discharging Provider: Yina Morales MD  Primary Care Provider: Yina Morales MD    Primary Care Team: Networked reference to record PCT     HPI:   Patient presents with c/o productive cough (thick clear) and SOB that began yesterday.  Denies fever.  In office flu/Covid negative.  States she did recently travel to [place].  States she flew to [place] on the 29th  and flew back home Sunday.  Patient O2 sat initially mid 70s on room air.  Slowly improved to low 90's after apx 15-20 minutes on 2L O2 via NC initiated upon arrival to office.  Patient does not have home O2 at this time.  In office, pt had resp insuuficincy with catlike noise when breathing and poor airflow  This am, pt reports her breathign is improving - no further noises with inspiration or expiration.  Less sob noted.      * No surgery found *      Hospital Course:   5/5 ND patient feeling better this a.m..  Off of all O2.  Limited coughing.  Did have some sundowning last night but this morning is awake and alert answering questions appropriately.  Spoke with daughter and granddaughter they have equipment at home needed to help care for patient with a nebulizer and can do the care that she needs at this time will DC home with p.o. antibiotics nebs t.i.d. and cough syrup twice a day       Goals of Care Treatment Preferences:  Code Status: DNR      Consults:   Consults (From admission, onward)        Status Ordering Provider     Inpatient consult to Social Work/Case Management  Once        Provider:  (Not yet assigned)    Acknowledged YINA MORALES     IP consult to case management  Once        Provider:  (Not yet assigned)     "Acknowledged YINA MORALES.          Pulmonary  * Respiratory insufficiency  Pt admitted with hypoxemia and dificulty with sob- started on iv abxs, nebs, iv steroids and O2 protocal - cta done showed no PE  5/5 impoved with tx of pneumo      Pneumonia due to infectious organism  Cont iv abxs and nebs  O2 protocal  5/5 dc home with abxs, nebs, and cough syrup and prednisoen    Cardiac/Vascular  Arteriosclerosis of coronary artery  cotn b-blocker, asa and statin      Renal/  Hypokalemia  Replace potassium bid  5/5 resolved      Lactic acidosis  improved with ivfs  5/5 resolved    GI  Gastroesophageal reflux  Start pepcid        Final Active Diagnoses:    Diagnosis Date Noted POA    PRINCIPAL PROBLEM:  Respiratory insufficiency [R06.89] 05/04/2023 Yes    Lactic acidosis [E87.20] 05/04/2023 Yes    Pneumonia due to infectious organism [J18.9] 05/04/2023 Yes    Hypokalemia [E87.6] 05/04/2023 Yes    Gastroesophageal reflux [K21.9] 06/03/2022 Yes    Arteriosclerosis of coronary artery [I25.10] 06/03/2022 Yes      Problems Resolved During this Admission:       Discharged Condition: good    Disposition: Home or Self Care    Follow Up:   Contact information for follow-up providers     Yina Morales MD Follow up in 1 week(s).    Specialty: Internal Medicine  Why: office will call mon to schedule appt  Contact information:  [address] [place] [id number]  [phone]                   Contact information for after-discharge care     Durable Medical Equipment     [facility] .    Service: Durable Medical Equipment  Contact information:  [address] [facility] [address] [phone]                           Patient Instructions:      WHEELCHAIR FOR HOME USE     Order Specific Question Answer Comments   Hours in W/C per day: 7    Type of Wheelchair: Standard    Size(Width): 18"(STD adult)    Leg Support: STD footrests    Lap Belt: Velcro    Accessories: Anti-tippers    Cushion: Basic  "   Reclining Back No    Height: 5' (1.524 m)    Weight: 40.8 kg (90 lb)    Does patient have medical equipment at home? nebulizer    Does patient have medical equipment at home? rollator    Length of need (1-99 months): 99    Please check all that apply: Caregiver is capable and willing to operate wheelchair safely.    Please check all that apply: The patient requires the use of a w/c for activities of daily living within the Home.      Diet Adult Regular     Activity as tolerated       Significant Diagnostic Studies: Labs:   CMP   Recent Labs   Lab 05/03/23  1039 05/04/23  0411 05/05/23  0439   * 135* 139   K 4.0 3.1* 4.0   CL 99 100 105   CO2 30* 31* 33*   * 136* 142*   BUN 15 11 12   CREATININE 1.0 0.7 0.7   CALCIUM 9.2 8.3* 8.2*   PROT 8.3 6.6 6.4   ALBUMIN 3.1* 2.3* 2.1*   BILITOT 0.6 0.6 0.4   ALKPHOS 70 54* 46*   AST 69* 43* 29   ALT 35 26 22   ANIONGAP 5* 4* 1*   , CBC   Recent Labs   Lab 05/03/23  1039 05/04/23  0411 05/05/23  0439   WBC 10.14 16.48* 10.79   HGB 14.4 12.0 10.9*   HCT 42.6 34.6* 31.7*    131* 131*    and All labs within the past 24 hours have been reviewed    Pending Diagnostic Studies:     None         Medications:  Reconciled Home Medications:      Medication List      START taking these medications    acetaminophen 325 MG tablet  Commonly known as: TYLENOL  Take 2 tablets (650 mg total) by mouth every 8 (eight) hours as needed for Pain or Temperature greater than (100, HA).     dextromethorphan-guaiFENesin  mg/5 ml  mg/5 mL liquid  Commonly known as: ROBITUSSIN-DM  Take 10 mLs by mouth 3 (three) times daily. for 5 days     levoFLOXacin 500 MG tablet  Commonly known as: LEVAQUIN  Take 1 tablet (500 mg total) by mouth once daily.     predniSONE 20 MG tablet  Commonly known as: DELTASONE  Take 1 tablet (20 mg total) by mouth once daily.        CHANGE how you take these medications    albuterol 2.5 mg /3 mL (0.083 %) nebulizer solution  Commonly known as:  PROVENTIL  Take 3 mLs (2.5 mg total) by nebulization 3 (three) times daily. Dx J44.9  What changed: See the new instructions.        CONTINUE taking these medications    aspirin 81 MG EC tablet  Commonly known as: ECOTRIN  Take 81 mg by mouth once daily.     cholecalciferol (vitamin D3) 50 mcg (2,000 unit) Cap capsule  Commonly known as: VITAMIN D3  1 capsule.     metoprolol succinate 25 MG 24 hr tablet  Commonly known as: TOPROL-XL  Take 25 mg by mouth.     pravastatin 80 MG tablet  Commonly known as: PRAVACHOL  Take 80 mg by mouth once daily.     VITAMIN B-12 ORAL  1,000 mcg.        STOP taking these medications    cefUROXime 250 MG tablet  Commonly known as: CEFTIN     doxycycline 100 MG Cap  Commonly known as: VIBRAMYCIN     hydroCHLOROthiazide 25 MG tablet  Commonly known as: HYDRODIURIL     metoprolol tartrate 25 MG tablet  Commonly known as: LOPRESSOR     NIFEdipine 30 MG (OSM) 24 hr tablet  Commonly known as: PROCARDIA-XL     nitrofurantoin 50 MG capsule  Commonly known as: MACRODANTIN     potassium chloride 20 mEq  Commonly known as: K-TAB     silver sulfADIAZINE 1% 1 % cream  Commonly known as: SILVADENE            Indwelling Lines/Drains at time of discharge:   Lines/Drains/Airways     None                 Time spent on the discharge of patient: 35 minutes         Radha Morales MD  Department of Hospital Medicine  [facility]

## 2023-05-05 NOTE — ASSESSMENT & PLAN NOTE
Pt admitted with hypoxemia and dificulty with sob- started on iv abxs, nebs, iv steroids and O2 protocal - cta done showed no PE  5/5 impoved with tx of pneumo

## 2023-05-05 NOTE — NURSING
Pt is confused and trying to get out of bed, removing O2 probes and B/P cuff. Redirected per staff to no avail. Family (granddgt) called to assist. Pt still has not calm down. Writer offered to call for something to help calm, granddaughter refused. Pt again getting agitated trying to remove the IV.Granddaughter requested provider called for something to help her relax if she continues. Writer spoke with Noni CELIS (on call for Dr. Morales) concerning agitation, and refusing to take med. New order for Geodon 10mg IM prn times one dose.

## 2023-05-05 NOTE — CARE UPDATE
Ace bandage removed from iv site - swelling noted above site - iv dc'd - no redness, edema, or pain noted at site - family present in room with pt

## 2023-05-05 NOTE — CARE UPDATE
Dr england in assessing pt, labs, and medications - speaking with family concerning pt's condition

## 2023-05-05 NOTE — CARE UPDATE
Pt discharged per wheelchair to waiting vehicle in stable condition - discharge instructions given to pt and family - voiced understanding of instructions

## 2023-05-05 NOTE — PLAN OF CARE
Problem: Adult Inpatient Plan of Care  Goal: Plan of Care Review  Outcome: Ongoing, Progressing  Goal: Patient-Specific Goal (Individualized)  Outcome: Ongoing, Progressing  Goal: Absence of Hospital-Acquired Illness or Injury  Outcome: Ongoing, Progressing  Goal: Optimal Comfort and Wellbeing  Outcome: Ongoing, Progressing  Goal: Readiness for Transition of Care  Outcome: Ongoing, Progressing     Problem: Skin Injury Risk Increased  Goal: Skin Health and Integrity  Outcome: Ongoing, Progressing     Problem: Fall Injury Risk  Goal: Absence of Fall and Fall-Related Injury  Outcome: Ongoing, Progressing     Problem: Impaired Wound Healing  Goal: Optimal Wound Healing  Outcome: Ongoing, Progressing     Problem: Fluid Imbalance (Pneumonia)  Goal: Fluid Balance  Outcome: Ongoing, Progressing     Problem: Infection (Pneumonia)  Goal: Resolution of Infection Signs and Symptoms  Outcome: Ongoing, Progressing     Problem: Respiratory Compromise (Pneumonia)  Goal: Effective Oxygenation and Ventilation  Outcome: Ongoing, Progressing     Problem: Oral Intake Inadequate  Goal: Improved Oral Intake  Outcome: Ongoing, Progressing

## 2023-05-05 NOTE — NURSING
Writer called back and spoke with Noni concerning warning pop up for concurrent use of Geodon and Azithromycin and Zofran. Will not order Geodon, continue to observe and see if pt calms down with family at bedside. No new orders at this time.

## 2023-05-05 NOTE — NURSING
Pt awake. No agitation at this time. No attempts to get OOB. Did allow staff to reapply B/P cuff and O2 probe. Weak, dry cough noted without resp distress. O2 96%. Purewick changed. Clear yellow. BM cleaned from rectum, but no stool to diaper. Fluid and snack offered and refused. IVF as ordered. Continue to observe.

## 2023-05-05 NOTE — NURSING
Pt restless, fidgeting, removing tele leads. Staff reapplies. Calming down a little. Visibly tired. Continue to observe.

## 2023-08-07 PROBLEM — J18.9 PNEUMONIA DUE TO INFECTIOUS ORGANISM: Status: RESOLVED | Noted: 2023-01-01 | Resolved: 2023-01-01

## 2023-09-11 PROBLEM — Z00.00 WELLNESS EXAMINATION: Status: RESOLVED | Noted: 2022-01-01 | Resolved: 2023-01-01

## 2023-09-29 PROBLEM — F41.9 ANXIETY: Status: ACTIVE | Noted: 2023-01-01

## 2023-09-29 PROBLEM — E46 MALNUTRITION: Status: ACTIVE | Noted: 2023-01-01

## 2023-09-29 PROBLEM — Z09 HOSPITAL DISCHARGE FOLLOW-UP: Status: ACTIVE | Noted: 2023-01-01

## 2023-09-29 PROBLEM — D69.6 THROMBOCYTOPENIA, UNSPECIFIED: Status: ACTIVE | Noted: 2023-01-01
